# Patient Record
Sex: FEMALE | Race: WHITE | NOT HISPANIC OR LATINO
[De-identification: names, ages, dates, MRNs, and addresses within clinical notes are randomized per-mention and may not be internally consistent; named-entity substitution may affect disease eponyms.]

---

## 2020-07-16 ENCOUNTER — APPOINTMENT (OUTPATIENT)
Dept: GERIATRICS | Facility: CLINIC | Age: 85
End: 2020-07-16

## 2020-07-16 ENCOUNTER — RESULT REVIEW (OUTPATIENT)
Age: 85
End: 2020-07-16

## 2020-07-16 VITALS
RESPIRATION RATE: 18 BRPM | HEART RATE: 70 BPM | DIASTOLIC BLOOD PRESSURE: 80 MMHG | HEIGHT: 66 IN | SYSTOLIC BLOOD PRESSURE: 164 MMHG | BODY MASS INDEX: 23.3 KG/M2 | WEIGHT: 145 LBS

## 2020-07-16 DIAGNOSIS — I49.3 VENTRICULAR PREMATURE DEPOLARIZATION: ICD-10-CM

## 2020-07-16 DIAGNOSIS — Z87.81 PERSONAL HISTORY OF (HEALED) TRAUMATIC FRACTURE: ICD-10-CM

## 2020-07-16 NOTE — ADDENDUM
[FreeTextEntry1] : Pt with fractures of 4 and 5 metatarsal. spoke to pt at radiology and she refused ER visit. Therefore i asked her to returned to office and i secured lightly with yunier taping/gauze in between toes. Lightly wrapped foot with ACE wrap. Instructed pt to rest and elevate; avoid walking if possible, remove ace wrap and tape if it becomes tight or uncomfortable. She verbalized understanding

## 2020-07-16 NOTE — HISTORY OF PRESENT ILLNESS
[FreeTextEntry1] : Pt states she caught her foot underneath the leg of a heavy chair on 7/15 in the evening\par and fell onto her leg Did not hit her head or other part of her body\par Now has pain and swelling but able to walk.\par She did go to [place] today for dental work via a taxi\par Has been icing her foot.\par No pain in ankle or leg

## 2020-07-16 NOTE — PHYSICAL EXAM
[General Appearance - Alert] : alert [General Appearance - In No Acute Distress] : in no acute distress [Sclera] : the sclera and conjunctiva were normal [PERRL With Normal Accommodation] : pupils were equal in size, round, and reactive to light [Respiration, Rhythm And Depth] : normal respiratory rhythm and effort [] : no respiratory distress [Auscultation Breath Sounds / Voice Sounds] : lungs were clear to auscultation bilaterally [Heart Rate And Rhythm] : heart rate was normal and rhythm regular [Apical Impulse] : the apical impulse was normal [Heart Sounds] : normal S1 and S2 [Motor Tone] : muscle strength and tone were normal [Abnormal Walk] : normal gait [FreeTextEntry1] : dorsum of left foot with swelling pain and bruising. No pain on palption of left ankle, no crepitus, no deformities. Able to move toes and ambulate wnl

## 2021-03-30 ENCOUNTER — APPOINTMENT (OUTPATIENT)
Dept: OBGYN | Facility: CLINIC | Age: 86
End: 2021-03-30
Payer: MEDICARE

## 2021-03-30 VITALS
WEIGHT: 143 LBS | SYSTOLIC BLOOD PRESSURE: 142 MMHG | HEIGHT: 64 IN | DIASTOLIC BLOOD PRESSURE: 84 MMHG | BODY MASS INDEX: 24.41 KG/M2

## 2021-03-30 DIAGNOSIS — Z01.419 ENCOUNTER FOR GYNECOLOGICAL EXAMINATION (GENERAL) (ROUTINE) W/OUT ABNORMAL FINDINGS: ICD-10-CM

## 2021-03-30 PROCEDURE — G0101: CPT

## 2021-03-31 PROBLEM — Z01.419 ENCOUNTER FOR ANNUAL ROUTINE GYNECOLOGICAL EXAMINATION: Status: RESOLVED | Noted: 2021-03-30 | Resolved: 2021-04-13

## 2021-03-31 NOTE — HISTORY OF PRESENT ILLNESS
[FreeTextEntry1] : 86 y o female presents to initiate  GYN care\par Last pap smear reportedly benign approximately four years ago\par Denies current GYN complaints\par Reports normal bowel and bladder function\par Not currently sexually active\par Postmenopausal with atrophic vaginitis for which she uses Premarin cream twice weekly\par Osteoporosis on Fosamax\par Colonoscopy > 10 yrs ago: normal ( did hemoccult yearly in past)\par Advised will prescribe Premarin cream when breast imaging results received and reviewed\par Breast imaging 2021 reportedly benign\par Bone density 2020 Osteoporosis

## 2021-04-14 ENCOUNTER — APPOINTMENT (OUTPATIENT)
Dept: INTERNAL MEDICINE | Facility: CLINIC | Age: 86
End: 2021-04-14
Payer: MEDICARE

## 2021-04-14 VITALS
TEMPERATURE: 97.5 F | HEART RATE: 83 BPM | BODY MASS INDEX: 21.17 KG/M2 | HEIGHT: 64 IN | OXYGEN SATURATION: 98 % | DIASTOLIC BLOOD PRESSURE: 82 MMHG | WEIGHT: 124 LBS | SYSTOLIC BLOOD PRESSURE: 148 MMHG

## 2021-04-14 DIAGNOSIS — I49.3 VENTRICULAR PREMATURE DEPOLARIZATION: ICD-10-CM

## 2021-04-14 DIAGNOSIS — M79.89 OTHER SPECIFIED SOFT TISSUE DISORDERS: ICD-10-CM
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
## 2021-04-14 DIAGNOSIS — M79.672 PAIN IN LEFT FOOT: ICD-10-CM

## 2021-04-14 DIAGNOSIS — S90.32XA CONTUSION OF LEFT FOOT, INITIAL ENCOUNTER: ICD-10-CM

## 2021-04-14 DIAGNOSIS — S92.343A DISPLACED FRACTURE OF FOURTH METATARSAL BONE, UNSPECIFIED FOOT, INITIAL ENCOUNTER FOR CLOSED FRACTURE: ICD-10-CM

## 2021-04-14 DIAGNOSIS — S92.353A DISPLACED FRACTURE OF FIFTH METATARSAL BONE, UNSPECIFIED FOOT, INITIAL ENCOUNTER FOR CLOSED FRACTURE: ICD-10-CM

## 2021-04-14 PROCEDURE — 99204 OFFICE O/P NEW MOD 45 MIN: CPT

## 2021-04-14 PROCEDURE — 36415 COLL VENOUS BLD VENIPUNCTURE: CPT

## 2021-04-14 RX ORDER — CYCLOSPORINE 0.5 MG/ML
0.05 EMULSION OPHTHALMIC TWICE DAILY
Refills: 0 | Status: ACTIVE | COMMUNITY
Start: 2021-04-14

## 2021-04-14 NOTE — REVIEW OF SYSTEMS
[Recent Change In Weight] : ~T recent weight change [Negative] : Neurological [FreeTextEntry2] : weight loss?

## 2021-04-14 NOTE — HISTORY OF PRESENT ILLNESS
[FreeTextEntry8] : here  to establish care , former px of dr Freitas, doing we ll \par  lives at [facility]

## 2021-05-03 ENCOUNTER — TRANSCRIPTION ENCOUNTER (OUTPATIENT)
Age: 86
End: 2021-05-03

## 2021-05-04 ENCOUNTER — TRANSCRIPTION ENCOUNTER (OUTPATIENT)
Age: 86
End: 2021-05-04

## 2021-05-14 ENCOUNTER — APPOINTMENT (OUTPATIENT)
Dept: INTERNAL MEDICINE | Facility: CLINIC | Age: 86
End: 2021-05-14

## 2021-05-19 ENCOUNTER — APPOINTMENT (OUTPATIENT)
Dept: INTERNAL MEDICINE | Facility: CLINIC | Age: 86
End: 2021-05-19
Payer: MEDICARE

## 2021-05-19 ENCOUNTER — LABORATORY RESULT (OUTPATIENT)
Age: 86
End: 2021-05-19

## 2021-05-19 VITALS
BODY MASS INDEX: 21.51 KG/M2 | TEMPERATURE: 96.7 F | DIASTOLIC BLOOD PRESSURE: 92 MMHG | SYSTOLIC BLOOD PRESSURE: 138 MMHG | HEART RATE: 76 BPM | HEIGHT: 64 IN | WEIGHT: 126 LBS

## 2021-05-19 DIAGNOSIS — Z78.9 OTHER SPECIFIED HEALTH STATUS: ICD-10-CM

## 2021-05-19 DIAGNOSIS — Z76.89 PERSONS ENCOUNTERING HEALTH SERVICES IN OTHER SPECIFIED CIRCUMSTANCES: ICD-10-CM

## 2021-05-19 PROCEDURE — 99214 OFFICE O/P EST MOD 30 MIN: CPT | Mod: 25

## 2021-05-19 PROCEDURE — 36415 COLL VENOUS BLD VENIPUNCTURE: CPT

## 2021-05-19 NOTE — REVIEW OF SYSTEMS
[Fever] : no fever [Chills] : no chills [Recent Change In Weight] : ~T no recent weight change [Joint Pain] : no joint pain [Muscle Weakness] : muscle weakness [Muscle Pain] : no muscle pain [Negative] : Neurological [FreeTextEntry9] : LE subjective

## 2021-05-19 NOTE — HISTORY OF PRESENT ILLNESS
[de-identified] : here for medical f/u , doing well , had some blood work with endo , high iPTH , (all results reviewed with patient)\par restarted training , feels less strong in  lower extremities

## 2021-05-20 LAB
APPEARANCE: ABNORMAL
BILIRUBIN URINE: NEGATIVE
BLOOD URINE: NEGATIVE
CHOLEST SERPL-MCNC: 239 MG/DL
COLOR: YELLOW
FOLATE SERPL-MCNC: 8.1 NG/ML
GLUCOSE QUALITATIVE U: NEGATIVE
HDLC SERPL-MCNC: 111 MG/DL
KETONES URINE: NEGATIVE
LDLC SERPL CALC-MCNC: 113 MG/DL
LEUKOCYTE ESTERASE URINE: ABNORMAL
NITRITE URINE: NEGATIVE
NONHDLC SERPL-MCNC: 128 MG/DL
PH URINE: 6
PROTEIN URINE: ABNORMAL
SPECIFIC GRAVITY URINE: 1.01
TRIGL SERPL-MCNC: 77 MG/DL
UROBILINOGEN URINE: NORMAL
VIT B12 SERPL-MCNC: 696 PG/ML

## 2021-05-25 ENCOUNTER — APPOINTMENT (OUTPATIENT)
Dept: INTERNAL MEDICINE | Facility: CLINIC | Age: 86
End: 2021-05-25
Payer: MEDICARE

## 2021-05-25 PROCEDURE — P9615: CPT

## 2021-05-26 LAB
CREAT SPEC-SCNC: 78 MG/DL
CREAT/PROT UR: 0.8 RATIO
PROT UR-MCNC: 64 MG/DL

## 2021-05-27 ENCOUNTER — TRANSCRIPTION ENCOUNTER (OUTPATIENT)
Age: 86
End: 2021-05-27

## 2021-06-30 ENCOUNTER — APPOINTMENT (OUTPATIENT)
Dept: INTERNAL MEDICINE | Facility: CLINIC | Age: 86
End: 2021-06-30
Payer: MEDICARE

## 2021-06-30 VITALS
TEMPERATURE: 97.1 F | BODY MASS INDEX: 21.17 KG/M2 | OXYGEN SATURATION: 98 % | HEART RATE: 83 BPM | HEIGHT: 64 IN | DIASTOLIC BLOOD PRESSURE: 76 MMHG | SYSTOLIC BLOOD PRESSURE: 144 MMHG | WEIGHT: 124 LBS

## 2021-06-30 PROCEDURE — 99214 OFFICE O/P EST MOD 30 MIN: CPT

## 2021-06-30 NOTE — HISTORY OF PRESENT ILLNESS
[de-identified] : here for medical f/u , wants to find out why losing weight, had visit c dr Hernandez, labs and US reviewed c px\par no physical complaints \par no real GI complaints  but colonoscopy in a many years

## 2021-06-30 NOTE — REVIEW OF SYSTEMS
[Fever] : no fever [Chills] : no chills [Recent Change In Weight] : ~T recent weight change [Abdominal Pain] : no abdominal pain [Constipation] : no constipation [Diarrhea] : diarrhea [Vomiting] : no vomiting [Melena] : no melena [Joint Pain] : no joint pain [Muscle Weakness] : muscle weakness [Muscle Pain] : no muscle pain [Negative] : Neurological [FreeTextEntry2] : 10 lbs over 1 year [FreeTextEntry9] : LE subjective

## 2021-07-06 ENCOUNTER — TRANSCRIPTION ENCOUNTER (OUTPATIENT)
Age: 86
End: 2021-07-06

## 2021-07-23 ENCOUNTER — TRANSCRIPTION ENCOUNTER (OUTPATIENT)
Age: 86
End: 2021-07-23

## 2021-08-03 ENCOUNTER — APPOINTMENT (OUTPATIENT)
Dept: INTERNAL MEDICINE | Facility: CLINIC | Age: 86
End: 2021-08-03
Payer: MEDICARE

## 2021-08-03 VITALS
HEIGHT: 64 IN | TEMPERATURE: 96.9 F | DIASTOLIC BLOOD PRESSURE: 80 MMHG | HEART RATE: 71 BPM | BODY MASS INDEX: 22.02 KG/M2 | SYSTOLIC BLOOD PRESSURE: 132 MMHG | OXYGEN SATURATION: 99 % | WEIGHT: 129 LBS

## 2021-08-03 PROCEDURE — 99213 OFFICE O/P EST LOW 20 MIN: CPT

## 2021-08-03 NOTE — HEALTH RISK ASSESSMENT
[No] : No [0] : 2) Feeling down, depressed, or hopeless: Not at all (0) [PHQ-2 Negative - No further assessment needed] : PHQ-2 Negative - No further assessment needed [[id number]] : 0

## 2021-08-03 NOTE — HISTORY OF PRESENT ILLNESS
[de-identified] : here for BP check after split in 1/2 the CCB dose, readings are great 120/70 mmHg average\par persistent LE paresthesias\par very active

## 2021-08-03 NOTE — REVIEW OF SYSTEMS
[Fever] : no fever [Chills] : no chills [Recent Change In Weight] : ~T no recent weight change [Abdominal Pain] : no abdominal pain [Constipation] : no constipation [Diarrhea] : diarrhea [Vomiting] : no vomiting [Melena] : no melena [Joint Pain] : no joint pain [Muscle Weakness] : no muscle weakness [Muscle Pain] : no muscle pain [Headache] : no headache [Dizziness] : no dizziness [Unsteady Walking] : no ataxia [Negative] : Respiratory [FreeTextEntry2] : gained 2 lb [de-identified] : LE numbness

## 2021-08-25 LAB — CYTOLOGY CVX/VAG DOC THIN PREP: NORMAL

## 2021-09-03 ENCOUNTER — APPOINTMENT (OUTPATIENT)
Dept: GASTROENTEROLOGY | Facility: CLINIC | Age: 86
End: 2021-09-03
Payer: MEDICARE

## 2021-09-03 VITALS
WEIGHT: 130 LBS | SYSTOLIC BLOOD PRESSURE: 168 MMHG | DIASTOLIC BLOOD PRESSURE: 84 MMHG | HEIGHT: 64 IN | BODY MASS INDEX: 22.2 KG/M2

## 2021-09-03 PROCEDURE — 99204 OFFICE O/P NEW MOD 45 MIN: CPT

## 2021-09-03 NOTE — ASSESSMENT
[FreeTextEntry1] : Will initially begin workup of unexplained weight loss with a CT A/P.\par \par If unremarkable, would plan on an EGD and colonoscopy for further evaluation.\par \par Thank you for referring Ms. HAMPTON.  Please do not hesitate to call to further discuss his/her care.\par \par Note faxed to requesting MD.\par \par

## 2021-09-03 NOTE — HISTORY OF PRESENT ILLNESS
[FreeTextEntry1] : Ms. Mg is a pleasant 86F h/o mixed connective tissue disorder, hyperparathyroid, HTN, osteoporosis, rotator cuff tear, nephrolithiasis, pre-DM who is referred by Dr. Ramirez for unexplained weight loss.\par \par States over the past year or two she has lost around 6 lbs without trying.  Has had a good appetite, has been eating a large amount. Her weight loss has mainly stabilized.\par \par She stopped working out during the COVID pandemic, although she has started again, thinks she may have lost some muscle mass.\par \elizabeth Generally feels well, has had mild constipation which improved with dietary changes.\par \par No rectal bleeding, black stool, abd pain, N/V, heartburn.\par \par She has had two colonoscopies previously, most recently 2007, both were "normal" as per her report.\par \elizabeth Has never had an EGD.\par \par Does not smoke or drink.\par \par Labs reviewed from 10/22/20 (scanned into chart).

## 2021-09-20 ENCOUNTER — TRANSCRIPTION ENCOUNTER (OUTPATIENT)
Age: 86
End: 2021-09-20

## 2021-09-22 ENCOUNTER — TRANSCRIPTION ENCOUNTER (OUTPATIENT)
Age: 86
End: 2021-09-22

## 2021-09-24 ENCOUNTER — RESULT REVIEW (OUTPATIENT)
Age: 86
End: 2021-09-24

## 2021-09-27 ENCOUNTER — APPOINTMENT (OUTPATIENT)
Dept: INTERNAL MEDICINE | Facility: CLINIC | Age: 86
End: 2021-09-27
Payer: MEDICARE

## 2021-09-27 VITALS
BODY MASS INDEX: 21.17 KG/M2 | HEART RATE: 72 BPM | SYSTOLIC BLOOD PRESSURE: 138 MMHG | WEIGHT: 124 LBS | TEMPERATURE: 96 F | HEIGHT: 64 IN | OXYGEN SATURATION: 98 % | DIASTOLIC BLOOD PRESSURE: 80 MMHG

## 2021-09-27 DIAGNOSIS — R93.5 ABNORMAL FINDINGS ON DIAGNOSTIC IMAGING OF OTHER ABDOMINAL REGIONS, INCLUDING RETROPERITONEUM: ICD-10-CM

## 2021-09-27 DIAGNOSIS — Z00.00 ENCOUNTER FOR GENERAL ADULT MEDICAL EXAMINATION W/OUT ABNORMAL FINDINGS: ICD-10-CM

## 2021-09-27 PROCEDURE — 99214 OFFICE O/P EST MOD 30 MIN: CPT | Mod: 25

## 2021-09-27 PROCEDURE — 36415 COLL VENOUS BLD VENIPUNCTURE: CPT

## 2021-09-27 NOTE — PHYSICAL EXAM
[No Edema] : there was no peripheral edema [Normal] : normal gait, coordination grossly intact, no focal deficits and deep tendon reflexes were 2+ and symmetric [de-identified] : L forearm cellulitis resolved

## 2021-09-27 NOTE — HISTORY OF PRESENT ILLNESS
[de-identified] : here for medical f/u  after L arm cellulitis , , skin flap healed , \par also abnormal creat on sat prior to CT c IV contrast ordered by carmela Hong\par her creat was 1.7( !! new)\par she feels relatively well

## 2021-09-27 NOTE — HEALTH RISK ASSESSMENT
[] : No [No] : No [0] : 2) Feeling down, depressed, or hopeless: Not at all (0) [PHQ-2 Negative - No further assessment needed] : PHQ-2 Negative - No further assessment needed [[id number]] : 0

## 2021-09-28 ENCOUNTER — TRANSCRIPTION ENCOUNTER (OUTPATIENT)
Age: 86
End: 2021-09-28

## 2021-09-28 LAB
ALBUMIN SERPL ELPH-MCNC: 5 G/DL
ALP BLD-CCNC: 76 U/L
ALT SERPL-CCNC: 26 U/L
ANION GAP SERPL CALC-SCNC: 18 MMOL/L
AST SERPL-CCNC: 34 U/L
BASOPHILS # BLD AUTO: 0.06 K/UL
BASOPHILS NFR BLD AUTO: 1.1 %
BILIRUB SERPL-MCNC: 0.2 MG/DL
BUN SERPL-MCNC: 45 MG/DL
CALCIUM SERPL-MCNC: 10.7 MG/DL
CHLORIDE SERPL-SCNC: 101 MMOL/L
CO2 SERPL-SCNC: 17 MMOL/L
CREAT SERPL-MCNC: 1.75 MG/DL
EOSINOPHIL # BLD AUTO: 0.03 K/UL
EOSINOPHIL NFR BLD AUTO: 0.5 %
GLUCOSE SERPL-MCNC: 117 MG/DL
HCT VFR BLD CALC: 40.7 %
HGB BLD-MCNC: 12.9 G/DL
IMM GRANULOCYTES NFR BLD AUTO: 0.2 %
LYMPHOCYTES # BLD AUTO: 1.38 K/UL
LYMPHOCYTES NFR BLD AUTO: 24.2 %
MAN DIFF?: NORMAL
MCHC RBC-ENTMCNC: 31.7 GM/DL
MCHC RBC-ENTMCNC: 32 PG
MCV RBC AUTO: 101 FL
MONOCYTES # BLD AUTO: 0.53 K/UL
MONOCYTES NFR BLD AUTO: 9.3 %
NEUTROPHILS # BLD AUTO: 3.69 K/UL
NEUTROPHILS NFR BLD AUTO: 64.7 %
PLATELET # BLD AUTO: 125 K/UL
POTASSIUM SERPL-SCNC: 5.3 MMOL/L
PROT SERPL-MCNC: 7.9 G/DL
RBC # BLD: 4.03 M/UL
RBC # FLD: 13.2 %
SODIUM SERPL-SCNC: 136 MMOL/L
WBC # FLD AUTO: 5.7 K/UL

## 2021-09-29 ENCOUNTER — TRANSCRIPTION ENCOUNTER (OUTPATIENT)
Age: 86
End: 2021-09-29

## 2021-10-05 ENCOUNTER — APPOINTMENT (OUTPATIENT)
Dept: INTERNAL MEDICINE | Facility: CLINIC | Age: 86
End: 2021-10-05
Payer: MEDICARE

## 2021-10-05 PROCEDURE — 36415 COLL VENOUS BLD VENIPUNCTURE: CPT

## 2021-10-06 ENCOUNTER — TRANSCRIPTION ENCOUNTER (OUTPATIENT)
Age: 86
End: 2021-10-06

## 2021-10-06 LAB
ANION GAP SERPL CALC-SCNC: 12 MMOL/L
BUN SERPL-MCNC: 36 MG/DL
CALCIUM SERPL-MCNC: 10.3 MG/DL
CHLORIDE SERPL-SCNC: 104 MMOL/L
CO2 SERPL-SCNC: 22 MMOL/L
CREAT SERPL-MCNC: 1.44 MG/DL
GLUCOSE SERPL-MCNC: 114 MG/DL
POTASSIUM SERPL-SCNC: 5.3 MMOL/L
SODIUM SERPL-SCNC: 137 MMOL/L

## 2021-10-07 ENCOUNTER — APPOINTMENT (OUTPATIENT)
Dept: UROLOGY | Facility: CLINIC | Age: 86
End: 2021-10-07
Payer: MEDICARE

## 2021-10-07 ENCOUNTER — TRANSCRIPTION ENCOUNTER (OUTPATIENT)
Age: 86
End: 2021-10-07

## 2021-10-07 VITALS
HEIGHT: 64 IN | SYSTOLIC BLOOD PRESSURE: 192 MMHG | WEIGHT: 124 LBS | BODY MASS INDEX: 21.17 KG/M2 | DIASTOLIC BLOOD PRESSURE: 84 MMHG | HEART RATE: 102 BPM | TEMPERATURE: 98.1 F

## 2021-10-07 PROCEDURE — 99204 OFFICE O/P NEW MOD 45 MIN: CPT

## 2021-10-07 PROCEDURE — 51798 US URINE CAPACITY MEASURE: CPT

## 2021-10-07 PROCEDURE — 36415 COLL VENOUS BLD VENIPUNCTURE: CPT

## 2021-10-07 NOTE — PHYSICAL EXAM

## 2021-10-07 NOTE — ADDENDUM
[FreeTextEntry1] : PVR\par A transabdominal ultrasound shows incomplete emptying of bladder small volume but there is significant thickening of the bladder wall suggesting work hypertrophy

## 2021-10-07 NOTE — ASSESSMENT
[FreeTextEntry1] : This is an initial visit for urology for this 86-year-old patient who is enjoyed good health all her life\par She has a history of hyperparathyroidism under control and no history of kidney stones\par She has hypertension under good control\par He is here because it has been a sudden dramatic change in her EGFR\par She has had no flank pain or renal incident to account for this\par On review of the CAT scan which was done without contrast there is clearly a right UPJ stenosis multiple renal cysts\par But fortunately there is no loss of renal parenchyma\par Therefore the change in kidney function is probably not related to his chronic congenital UPJ stenosis\par There's been a dramatic change in her kidney function which is unaccounted for\par The only event that has occurred his infection the cellulitis in her left forearm for which she was given Bactrim\par The kidney function and a very gratifying fashion is now improving\par I repeated today's kidney function test suggested that it be done weekly until the stable value is  achieved\par I discussed with her the condition of the UPJ stenosis which requires close followup perhaps every 6 months until we see that the stable her anteriorly after that\par I suspect with preservation of good kidney function at 86 years and this is not going to be a clinical problem\par She is aware that if there is a change a robotic pyeloplasty can be achieved however I don't think that'll be necessary\par An unrelated issue I believe this onset of urinary frequency which has been present now for several months\par This tissue is confirmed by an ultrasound which shows bladder wall thickening\par This is a minor problem which is easily corrected\par I suggested that she put off addressing this issue until the kidney function is restored to its prior level

## 2021-10-08 LAB
APPEARANCE: CLEAR
BACTERIA: NEGATIVE
BILIRUBIN URINE: NEGATIVE
BLOOD URINE: NEGATIVE
COLOR: NORMAL
GLUCOSE QUALITATIVE U: NEGATIVE
HYALINE CASTS: 1 /LPF
KETONES URINE: NEGATIVE
LEUKOCYTE ESTERASE URINE: NEGATIVE
MICROSCOPIC-UA: NORMAL
NITRITE URINE: NEGATIVE
PH URINE: 6
PROTEIN URINE: ABNORMAL
RED BLOOD CELLS URINE: 0 /HPF
SPECIFIC GRAVITY URINE: 1.01
SQUAMOUS EPITHELIAL CELLS: 1 /HPF
UROBILINOGEN URINE: NORMAL
WHITE BLOOD CELLS URINE: 1 /HPF

## 2021-10-13 ENCOUNTER — APPOINTMENT (OUTPATIENT)
Dept: UROLOGY | Facility: CLINIC | Age: 86
End: 2021-10-13
Payer: MEDICARE

## 2021-10-13 VITALS
TEMPERATURE: 98.1 F | WEIGHT: 124 LBS | HEART RATE: 80 BPM | SYSTOLIC BLOOD PRESSURE: 174 MMHG | HEIGHT: 64 IN | DIASTOLIC BLOOD PRESSURE: 84 MMHG | BODY MASS INDEX: 21.17 KG/M2

## 2021-10-13 PROCEDURE — 51798 US URINE CAPACITY MEASURE: CPT

## 2021-10-13 PROCEDURE — 36415 COLL VENOUS BLD VENIPUNCTURE: CPT

## 2021-10-13 PROCEDURE — 99214 OFFICE O/P EST MOD 30 MIN: CPT

## 2021-10-13 NOTE — ASSESSMENT
[FreeTextEntry1] : This is a followup for this 86-year-old patient for injury to her left forearm were then developed cellulitis and was given Bactrim\par There is a significant drop in her renal function baseline but after discontinuing the Bactrim kidney function is returning\par Today repeated the creatinine clearance and when she is stabilized we'll further in evaluating her voiding dysfunction\par Incidentally noted was a UPJ stenosis which may have some part of her renal insufficiency\par His good maintenance of parenchymal thickness however\par I believe this is to be followed in I. will suggest a baseline renal scan for future comparison when her creatinine clearance stabilizes\par she brought up the issue is whether her bladder is thickwalled with same\par Advice on CAT scan with a full bladder the wall thickness appears thin but on ultrasound the bladder wall is somewhat thickened compared to normal

## 2021-10-14 ENCOUNTER — NON-APPOINTMENT (OUTPATIENT)
Age: 86
End: 2021-10-14

## 2021-10-15 ENCOUNTER — NON-APPOINTMENT (OUTPATIENT)
Age: 86
End: 2021-10-15

## 2021-10-16 ENCOUNTER — NON-APPOINTMENT (OUTPATIENT)
Age: 86
End: 2021-10-16

## 2021-10-18 ENCOUNTER — TRANSCRIPTION ENCOUNTER (OUTPATIENT)
Age: 86
End: 2021-10-18

## 2021-10-18 ENCOUNTER — NON-APPOINTMENT (OUTPATIENT)
Age: 86
End: 2021-10-18

## 2021-10-20 ENCOUNTER — TRANSCRIPTION ENCOUNTER (OUTPATIENT)
Age: 86
End: 2021-10-20

## 2021-10-20 ENCOUNTER — APPOINTMENT (OUTPATIENT)
Dept: UROLOGY | Facility: CLINIC | Age: 86
End: 2021-10-20

## 2021-10-29 ENCOUNTER — LABORATORY RESULT (OUTPATIENT)
Age: 86
End: 2021-10-29

## 2021-10-29 ENCOUNTER — APPOINTMENT (OUTPATIENT)
Dept: INTERNAL MEDICINE | Facility: CLINIC | Age: 86
End: 2021-10-29
Payer: MEDICARE

## 2021-10-29 ENCOUNTER — APPOINTMENT (OUTPATIENT)
Dept: UROLOGY | Facility: CLINIC | Age: 86
End: 2021-10-29

## 2021-10-29 VITALS
DIASTOLIC BLOOD PRESSURE: 84 MMHG | HEIGHT: 65 IN | BODY MASS INDEX: 20.33 KG/M2 | TEMPERATURE: 97.8 F | HEART RATE: 80 BPM | WEIGHT: 122 LBS | SYSTOLIC BLOOD PRESSURE: 150 MMHG

## 2021-10-29 VITALS — DIASTOLIC BLOOD PRESSURE: 80 MMHG | SYSTOLIC BLOOD PRESSURE: 150 MMHG

## 2021-10-29 DIAGNOSIS — L03.114 CELLULITIS OF LEFT UPPER LIMB: ICD-10-CM

## 2021-10-29 PROCEDURE — G0439: CPT

## 2021-10-29 PROCEDURE — 99214 OFFICE O/P EST MOD 30 MIN: CPT | Mod: 25

## 2021-10-29 RX ORDER — SULFAMETHOXAZOLE AND TRIMETHOPRIM 800; 160 MG/1; MG/1
800-160 TABLET ORAL
Qty: 20 | Refills: 0 | Status: DISCONTINUED | COMMUNITY
Start: 2021-09-17 | End: 2021-10-29

## 2021-10-29 NOTE — HISTORY OF PRESENT ILLNESS
[de-identified] : here for wellness exam, doing well , very active vidhi Escobar urologist \par  10 /17 , accidental fall in her apt - seen in ED - all imaging neg , \par  has upcoming appointment  c dr Shine and dr Cole

## 2021-10-29 NOTE — PHYSICAL EXAM
[Normal] : no joint swelling, grossly normal strength/tone [de-identified] : bilat normlal no lumps [de-identified] : thoracic scoliosis (old) [de-identified] : fro age

## 2021-10-29 NOTE — HEALTH RISK ASSESSMENT
[Good] : ~his/her~  mood as  good [Yes] : Yes [0] : 2) Feeling down, depressed, or hopeless: Not at all (0) [PHQ-2 Negative - No further assessment needed] : PHQ-2 Negative - No further assessment needed [Patient reported mammogram was normal] : Patient reported mammogram was normal [Patient reported bone density results were abnormal] : Patient reported bone density results were abnormal [Alone] : lives alone [College] : College [] :  [# Of Children ___] : has [unfilled] children [] : No [[id number]] : 0 [MammogramDate] : 11/20 [BoneDensityDate] : 01/20 [de-identified] : [place]

## 2021-10-29 NOTE — REVIEW OF SYSTEMS
[Negative] : Heme/Lymph [Muscle Pain] : muscle pain [Muscle Weakness] : no muscle weakness [FreeTextEntry9] : lower back and buttocks after fall, better every day

## 2021-11-01 ENCOUNTER — TRANSCRIPTION ENCOUNTER (OUTPATIENT)
Age: 86
End: 2021-11-01

## 2021-11-01 LAB
ALBUMIN SERPL ELPH-MCNC: 4.7 G/DL
ALP BLD-CCNC: 110 U/L
ALT SERPL-CCNC: 13 U/L
ANION GAP SERPL CALC-SCNC: 13 MMOL/L
APPEARANCE: CLEAR
AST SERPL-CCNC: 17 U/L
BASOPHILS # BLD AUTO: 0.04 K/UL
BASOPHILS NFR BLD AUTO: 0.6 %
BILIRUB SERPL-MCNC: 0.5 MG/DL
BILIRUBIN URINE: NEGATIVE
BLOOD URINE: NEGATIVE
BUN SERPL-MCNC: 34 MG/DL
CALCIUM SERPL-MCNC: 10.8 MG/DL
CHLORIDE SERPL-SCNC: 104 MMOL/L
CHOLEST SERPL-MCNC: 231 MG/DL
CO2 SERPL-SCNC: 22 MMOL/L
COLOR: NORMAL
CREAT SERPL-MCNC: 1.36 MG/DL
CREAT SPEC-SCNC: 72 MG/DL
CREAT/PROT UR: 1.1 RATIO
EOSINOPHIL # BLD AUTO: 0.06 K/UL
EOSINOPHIL NFR BLD AUTO: 0.8 %
FOLATE SERPL-MCNC: 9.1 NG/ML
GLUCOSE QUALITATIVE U: NEGATIVE
GLUCOSE SERPL-MCNC: 124 MG/DL
HCT VFR BLD CALC: 38.3 %
HDLC SERPL-MCNC: 103 MG/DL
HGB BLD-MCNC: 11.9 G/DL
IMM GRANULOCYTES NFR BLD AUTO: 0.1 %
KETONES URINE: NEGATIVE
LDLC SERPL CALC-MCNC: 114 MG/DL
LEUKOCYTE ESTERASE URINE: NEGATIVE
LYMPHOCYTES # BLD AUTO: 1.21 K/UL
LYMPHOCYTES NFR BLD AUTO: 17 %
MAN DIFF?: NORMAL
MCHC RBC-ENTMCNC: 31.1 GM/DL
MCHC RBC-ENTMCNC: 31.7 PG
MCV RBC AUTO: 102.1 FL
MONOCYTES # BLD AUTO: 0.72 K/UL
MONOCYTES NFR BLD AUTO: 10.1 %
NEUTROPHILS # BLD AUTO: 5.06 K/UL
NEUTROPHILS NFR BLD AUTO: 71.4 %
NITRITE URINE: NEGATIVE
NONHDLC SERPL-MCNC: 129 MG/DL
PH URINE: 6
PLATELET # BLD AUTO: 168 K/UL
POTASSIUM SERPL-SCNC: 5.2 MMOL/L
PROT SERPL-MCNC: 7.3 G/DL
PROT UR-MCNC: 79 MG/DL
PROTEIN URINE: ABNORMAL
RBC # BLD: 3.75 M/UL
RBC # FLD: 12.9 %
SODIUM SERPL-SCNC: 139 MMOL/L
SPECIFIC GRAVITY URINE: 1.01
T4 FREE SERPL-MCNC: 1.5 NG/DL
TRIGL SERPL-MCNC: 73 MG/DL
TSH SERPL-ACNC: 1.6 UIU/ML
UROBILINOGEN URINE: NORMAL
VIT B12 SERPL-MCNC: 528 PG/ML
WBC # FLD AUTO: 7.1 K/UL

## 2021-11-03 ENCOUNTER — TRANSCRIPTION ENCOUNTER (OUTPATIENT)
Age: 86
End: 2021-11-03

## 2021-11-04 ENCOUNTER — APPOINTMENT (OUTPATIENT)
Dept: NEUROLOGY | Facility: CLINIC | Age: 86
End: 2021-11-04
Payer: MEDICARE

## 2021-11-04 ENCOUNTER — NON-APPOINTMENT (OUTPATIENT)
Age: 86
End: 2021-11-04

## 2021-11-04 ENCOUNTER — LABORATORY RESULT (OUTPATIENT)
Age: 86
End: 2021-11-04

## 2021-11-04 VITALS
BODY MASS INDEX: 20.33 KG/M2 | WEIGHT: 122 LBS | TEMPERATURE: 98.2 F | HEIGHT: 65 IN | HEART RATE: 99 BPM | SYSTOLIC BLOOD PRESSURE: 175 MMHG | DIASTOLIC BLOOD PRESSURE: 94 MMHG

## 2021-11-04 PROCEDURE — 99203 OFFICE O/P NEW LOW 30 MIN: CPT

## 2021-11-08 ENCOUNTER — TRANSCRIPTION ENCOUNTER (OUTPATIENT)
Age: 86
End: 2021-11-08

## 2021-11-08 NOTE — CONSULT LETTER
[Dear  ___] : Dear  [unfilled], [FreeTextEntry1] : I had the pleasure of evaluating your patient, KARTHIK HAMPTON. Please see the assessment section below for a summary of my diagnostic impression and plan.\par \par Thank you very much for allowing me to participate in the care of this patient. If you have any questions, please do not hesitate to contact me. \par \par Sincerely,\par \par Rosanna Hester MD\par

## 2021-11-08 NOTE — DATA REVIEWED
[de-identified] : October 17, 2021: CT cervical spine and CT head reviewed, see scanned documents.

## 2021-11-08 NOTE — REVIEW OF SYSTEMS
[Feeling Tired] : feeling tired [Numbness] : numbness [Anxiety] : anxiety [Loss Of Hearing] : hearing loss [Joint Pain] : joint pain [Itching] : itching [Swollen Glands] : swollen glands [Negative] : Gastrointestinal [de-identified] : dry skin [de-identified] : thyroid nodule

## 2021-11-08 NOTE — PHYSICAL EXAM
[FreeTextEntry1] : Physical examination \par General: No acute distress, Awake, Alert.   \par \par Mental status \par Awake, alert, and oriented to person, time and place, Normal attention span and concentration, Recent and remote memory intact, Language intact, Fund of knowledge intact.   \par \par \par Cranial Nerves \par II: VFF  \par III, IV, VI: PERRL, EOMI.   \par V: Facial sensation is normal B/L.   \par VII: Facial strength is normal B/L. \par VIII: Decreased hearing, bilaterally. \par IX, X: Palate is midline and elevates symmetrically.   \par XI: Trapezius normal strength.   \par XII: Tongue midline without atrophy or fasciculations. \par \par Motor exam  \par Muscle tone - mild cogwheel rigidity in arms. \par No atrophy or fasciculations \par Muscle Strength: arms and legs, proximal and distal flexors and extensors are normal \par \par No UE drift.\par \par Reflexes \par \par Diffuse hyperreflexia.  \par \par Plantars right: mute.   \par Plantars left: mute.   \par \par Coordination \par Finger to nose: Normal.  \par Heel to shin: Normal.   \par \par \par Sensory \par Intact sensation to vibration, PP and cold.\par \par \par \par Gait \par Normal including heels, toes, and tandem gait - all normal for age.\par \par Slow, wide based gait. \par \par

## 2021-11-08 NOTE — HISTORY OF PRESENT ILLNESS
[FreeTextEntry1] : This is an 86-year-old woman who has had paresthesias in the soles since 2009.  She also has some balance difficulty.  She sees a  who works with her on her balance.  Over the past few years the paresthesias in the balance have worsened somewhat.  She has had multiple surgeries on the superficial vasculature in her legs.\par On October 14, 2020 1 in the evening she lost her balance and fell flat backwards.  She had no loss of consciousness.  She was shaken up.  She had some soft tissue swelling in the back of her head.  She had a CT head and cervical spine 3 days later which showed no fracture and no bleeding.  Since the fall she has had sacral pain, right hamstring pain, buttock pain, intermittent cramps.  These pains have all improved somewhat since onset.\par \par She has a history of notalgia paresthetica: Having an itchy area and hyperpigmentation in the neck posteriorly.\par

## 2021-11-08 NOTE — ASSESSMENT
[FreeTextEntry1] : This is an 86-year-old woman with gait abnormality with a recent fall.\par She continues to have low back pain: I recommend a consultation with pain management.\par She is already doing balance exercises with her .\par \par She has had paresthesias in the soles for the past 12 years.  She has no signs of a peripheral neuropathy on examination at this time.  I recommend testing to look for an underlying cause of a peripheral neuropathy; see below.\par \par I discussed in detail with the patient the diagnosis, prognosis, treatment plan and answered all of their questions.\par \par Elevated BP - asymptomatic - F/U with PMD for further management.

## 2021-11-09 ENCOUNTER — APPOINTMENT (OUTPATIENT)
Dept: PAIN MANAGEMENT | Facility: CLINIC | Age: 86
End: 2021-11-09
Payer: MEDICARE

## 2021-11-09 VITALS — HEIGHT: 65 IN | TEMPERATURE: 98.2 F | SYSTOLIC BLOOD PRESSURE: 160 MMHG | DIASTOLIC BLOOD PRESSURE: 89 MMHG

## 2021-11-09 DIAGNOSIS — M79.10 MYALGIA, UNSPECIFIED SITE: ICD-10-CM

## 2021-11-09 PROCEDURE — 99204 OFFICE O/P NEW MOD 45 MIN: CPT

## 2021-11-09 NOTE — CONSULT LETTER
[Dear  ___] : Dear  [unfilled], [Consult Letter:] : I had the pleasure of evaluating your patient, [unfilled]. [Please see my note below.] : Please see my note below. [Consult Closing:] : Thank you very much for allowing me to participate in the care of this patient.  If you have any questions, please do not hesitate to contact me. [Sincerely,] : Sincerely, [FreeTextEntry3] : Alex Grewal DO

## 2021-11-09 NOTE — HISTORY OF PRESENT ILLNESS
[Back Pain] : back pain [___ days] : [unfilled] day(s) ago [Constant] : constant [6] : a minimum pain level of 6/10 [7] : a maximum pain level of 7/10 [Aching] : aching [Sitting] : sitting [Heat] : heat [FreeTextEntry1] : HPI\par \par Ms. KARTHIK HAMPTON is a 86 year F with PHx as below, referred by Dr Hester  who presents today with chief complaint of coccygeal pain. Reports that it developed following a fall in October. It is located coccyx, slight deviation to the right;  The pain is presently 7/10 in severity on the numerical rating scale. It is sharp in nature. The pain is constant.  The pain is aggravated with sitting on hard surfacesThe pain improves with rest, standing The pain is functionally and emotionally disabling for the patient as its preventing them from going about activities of daily living, such as routine housework. The pain does not impair the patient’s ability to sleep. \par  \par Adjuvant hx for htn, hyperparathyroid, CKD, mixed connective tissues disease, PVC's, chronic ITP, notalgia paresthetica, cellulitis of LUE. Reports there is NO present numbness, there is NO weakness. Patient denies any bowel/bladder incontinence, no saddle/perineal anesthesia or any other red flag signs or symptoms. Reports regular BMs.\par    [FreeTextEntry2] : 22 [FreeTextEntry7] : Referred by Dr. Kimberli Hester. Lower back pain from a fall in her bathroom.

## 2021-11-09 NOTE — REVIEW OF SYSTEMS
[Back Pain] : back pain [Muscle Pain] : muscle pain [Negative] : Heme/Lymph [Discharge] : no discharge [Eye Pain] : no eye pain [Decrease Hearing] : no decrease in hearing [Nasal Discharge] : no nasal discharge [SOB at rest] : no shortness of breath at rest [Cough] : no cough [Abdominal Pain] : no abdominal pain [Constipation] : no constipation [Urinary Frequency] : no urinary frequency [Urinary Urgency] : no urinary urgency [Breast Pain] : no pain ~T in breast [Breast Lump] : no breast lump [Numbness] : no numbness [Headache] : no headache [Dizziness] : no dizziness [Anxiety] : no anxiety [Depression] : no depression [Feeling Weak] : not feeling weak [Muscle Weakness] : no muscle weakness [FreeTextEntry2] : weight loss [FreeTextEntry5] : pvc's [de-identified] : ITP

## 2021-11-09 NOTE — ASSESSMENT
[FreeTextEntry1] : Ms. 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 \par  \par Avoid NSAIDs due to CKD\par  \par >> Interventions\par  \par Arrange for Ganglion of Impar Block - under fluoroscopic guidance. Success rate and possible complications discussed with patient in detail. \par  \par >> Therapy and Other Modalities\par  \par Continue with daily home stretcing regimen\par \par >> Imaging and Other Studies\par \par I have personally reviewed the images in detail together with the patient today, and I have answered all questions regarding this condition to the best of my ability, to the patient's satisfaction. \par \par >> Consults\par \par None ordered

## 2021-11-09 NOTE — PHYSICAL EXAM
[de-identified] : General: Well-developed and well-nourished.  No acute distress.\par Psychiatric: Behavior was cooperative\par Head:  Normocephalic and atraumatic\par Eyes:  Sclera white. Conjunctiva and eyelids pink and moist without discharge.\par Cardiovascular:  Regular\par Respiratory:  Trachea midline. Normal effort.\par No accessory muscle use with respiration\par Lumbar Spine: No pain with extension,   normal ROM\par ++ tenderness about the coccyx reproducing pain\par Extremities: No edema \par Musculoskeletal: Moving all extremities freely\par SLR negative\par Neuro: CN 2-12 Grossly intact\par Sensation to light touch is intact extremities\par Gait: Ambulates without assistive device.

## 2021-11-09 NOTE — DATA REVIEWED
[FreeTextEntry1] : Exam: XR L S SPINE 4 OR MORE VWS (C); XR PELVIS 1 OR 2 VIEW (COMMON) \par \par Order#: [id number]; [id number] \par \par \par \par EXAM: XR lumbar spine, multiple views, XR pelvis, multiple views \par \par  INDICATION: Lumbar back pain, pelvic pain \par \par  TECHNIQUE: AP, bilateral feet and lateral radiographs of lumbar spine obtained. AP radiographs of \par pelvis obtained. \par \par  COMPARISON: None available \par \par  FINDINGS: \par \par  5 nonrib-bearing lumbar-type vertebral bodies are noted. For purposes of report, inferior-most \par well-formed intervertebral disc space will be designated as L5-S1. No radiographic evidence for \par transitional lumbosacral anatomy. \par \par  Moderate dextrocurvature centered at the lower lumbar spine. Lumbar vertebral body heights are \par maintained. No significant spondylolisthesis. There are mild multilevel degenerative endplate \par changes with osteophyte formation, intervertebral disc space narrowing and endplate irregularity. \par Sacrum is obscured by bowel gas. Osteopenia. \par \par  Atherosclerotic calcifications of the abdominal aorta. \par \par  No acute displaced fracture or dislocation identified in the visualized pelvis. There are bilateral\par degenerative changes of the hips with joint space narrowing, osteophytic formation and subchondral \par sclerosis. \par \par \par \par  IMPRESSION: \par \par  No radiographic evidence for lumbar compression fracture or acute displaced fracture of the pelvis. \par  Please note that, relative to radiography, MR spine imaging is a more sensitive imaging

## 2021-11-10 ENCOUNTER — TRANSCRIPTION ENCOUNTER (OUTPATIENT)
Age: 86
End: 2021-11-10

## 2021-11-10 LAB
ALBUMIN MFR SERPL ELPH: 57.4 %
ALBUMIN SERPL-MCNC: 4.5 G/DL
ALBUMIN/GLOB SERPL: 1.4 RATIO
ALBUPE: 73.3 %
ALPHA1 GLOB MFR SERPL ELPH: 5.1 %
ALPHA1 GLOB SERPL ELPH-MCNC: 0.4 G/DL
ALPHA1UPE: 8.3 %
ALPHA2 GLOB MFR SERPL ELPH: 12.9 %
ALPHA2 GLOB SERPL ELPH-MCNC: 1 G/DL
ALPHA2UPE: 5.5 %
B-GLOBULIN MFR SERPL ELPH: 10.6 %
B-GLOBULIN SERPL ELPH-MCNC: 0.8 G/DL
BETAUPE: 6.5 %
CREAT 24H UR-MCNC: NORMAL G/24 H
CREATININE UR (MAYO): 120 MG/DL
ESTIMATED AVERAGE GLUCOSE: 117 MG/DL
GAMMA GLOB FLD ELPH-MCNC: 1.1 G/DL
GAMMA GLOB MFR SERPL ELPH: 14 %
GAMMAUPE: 6.4 %
HBA1C MFR BLD HPLC: 5.7 %
IGA 24H UR QL IFE: NORMAL
INTERPRETATION SERPL IEP-IMP: NORMAL
KAPPA LC 24H UR QL: NORMAL
M PROTEIN SPEC IFE-MCNC: NORMAL
PROT PATTERN 24H UR ELPH-IMP: NORMAL
PROT SERPL-MCNC: 7.8 G/DL
PROT SERPL-MCNC: 7.8 G/DL
PROT UR-MCNC: 157 MG/DL
PROT UR-MCNC: 157 MG/DL

## 2021-11-11 ENCOUNTER — RESULT REVIEW (OUTPATIENT)
Age: 86
End: 2021-11-11

## 2021-11-11 ENCOUNTER — APPOINTMENT (OUTPATIENT)
Dept: NEPHROLOGY | Facility: CLINIC | Age: 86
End: 2021-11-11
Payer: MEDICARE

## 2021-11-11 ENCOUNTER — NON-APPOINTMENT (OUTPATIENT)
Age: 86
End: 2021-11-11

## 2021-11-11 VITALS
SYSTOLIC BLOOD PRESSURE: 180 MMHG | WEIGHT: 125 LBS | BODY MASS INDEX: 20.83 KG/M2 | TEMPERATURE: 97.8 F | HEIGHT: 65 IN | OXYGEN SATURATION: 99 % | DIASTOLIC BLOOD PRESSURE: 80 MMHG | HEART RATE: 79 BPM

## 2021-11-11 PROCEDURE — P9615: CPT

## 2021-11-11 PROCEDURE — 99204 OFFICE O/P NEW MOD 45 MIN: CPT

## 2021-11-11 NOTE — ASSESSMENT
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 Underlying CKD stage 3 likely due to long-standing hypertension. Adequate bp control . Avoid nephrotoxins/NSAIDs. \par \par #Proteinuria with upcr 1.1 - negative MM workup w/ negative SPEP, IFx, UPEP. Will repeat upcr today and consider adding ACE/ARBs.\par \par #Hypertension - sbp 170-180 mmHg in the office, not well controlled, on amlodipine 2.5 mg po bid - better bp control needed, recommended amlodipine 5 mg po qam, monitor bp at home, sbp goal in 120's (<130/80 mmHg) having worsening renal function and proteinuria. Once renal function remains stable will consider adding ACE/ARBs.\par \par #R UPJ stenosis - abdominal CT scan w/o contrast in Sep 2021 showing right UPJ stenosis multiple renal cysts with no loss of renal parenchyma. Urologist Dr Luc Glynn is considering renal nuclear scan.\par \par #Multiple renal cysts - Urologist Dr Luc Glynn is following.\par \par #HPTH - no Vit D and calcium supplements recommended, follow up with Endocrinologist. \par \par #Osteoporosis - currently on Fosamax weekly, once eGFR <30 ml/min medication needs to be discontinued.

## 2021-11-11 NOTE — HISTORY OF PRESENT ILLNESS
[FreeTextEntry1] : 87 yo woman with PMHx of HTN, HPTH,  Stenosis of UPJ, Myalgia, Thyroid nodule, Osteoporosis presents today for evaluation of renal function.\par \par denies recent acute illnesses or infections; ER visit 10/17/21 s/p fall, had CT head/spine done; no changes in appetite, but admits to unintentional weight loss 8-10 lbs over past year, denies cp, sob, n/v/d or constipation, abdominal or flank pain, dysuria, hematuria or frothy urine, no edema, no bony aches. \par increased urinary frequency evaluated by Urologist with hypertrophied bladder wall noted.\par denies hx pyelonephritis, kidney stones, diabetes or relevant family hx.\par no recent IV contrast exposure or chronic NSAIDs use.\par \par Rising creatinine up to 1.7 noted after episode of left forearm cellulitis treated with Bactrim that decreased down to 1.3 and remains stable, proteinuria with upcr 1.1, negative MM workup w/ negative SPEP, IFx, UPEP.\par \par Abdominal CT scan w/o contrast in Sep 2021 showing right UPJ stenosis multiple renal cysts with no loss of renal parenchyma. Urologist Dr Luc Glynn is considering renal nuclear scan.\par

## 2021-11-11 NOTE — PHYSICAL EXAM
[General Appearance - Alert] : alert [General Appearance - In No Acute Distress] : in no acute distress [General Appearance - Well Nourished] : well nourished [General Appearance - Well Developed] : well developed [Extraocular Movements] : extraocular movements were intact [Neck Appearance] : the appearance of the neck was normal [Jugular Venous Distention Increased] : there was no jugular-venous distention [] : no respiratory distress [Respiration, Rhythm And Depth] : normal respiratory rhythm and effort [Exaggerated Use Of Accessory Muscles For Inspiration] : no accessory muscle use [Auscultation Breath Sounds / Voice Sounds] : lungs were clear to auscultation bilaterally [Heart Rate And Rhythm] : heart rate was normal and rhythm regular [Edema] : there was no peripheral edema [Heart Sounds] : normal S1 and S2 [No CVA Tenderness] : no ~M costovertebral angle tenderness [Abnormal Walk] : normal gait [Skin Turgor] : normal skin turgor [Cranial Nerves] : cranial nerves 2-12 were intact [Oriented To Time, Place, And Person] : oriented to person, place, and time [FreeTextEntry1] : bilateral

## 2021-11-23 ENCOUNTER — APPOINTMENT (OUTPATIENT)
Dept: PAIN MANAGEMENT | Facility: HOSPITAL | Age: 86
End: 2021-11-23

## 2021-11-23 ENCOUNTER — APPOINTMENT (OUTPATIENT)
Dept: UROLOGY | Facility: CLINIC | Age: 86
End: 2021-11-23
Payer: MEDICARE

## 2021-11-23 PROCEDURE — 99214 OFFICE O/P EST MOD 30 MIN: CPT

## 2021-11-23 NOTE — ASSESSMENT
[FreeTextEntry1] : This is a followup visit for this 86 the patient has long-standing hypertension and insufficiency stage III\par Her creatinine clearance at the best level in January of 2020 with 40 she then had a decrease in renal function after she had an allergic reaction to medication given for a skin rash\par Creatinine clearance spell that is recovering now and is currently up to about 35\par During his evaluation he had a CT scan in the absence of any symptomatology from kidney we found the UPJ stenosis\par The patient is now scheduled for a renal scan with Lasix to see if there is obstruction to the kidney possibly contributing to her renal insufficiency\par

## 2021-11-29 ENCOUNTER — TRANSCRIPTION ENCOUNTER (OUTPATIENT)
Age: 86
End: 2021-11-29

## 2021-11-30 ENCOUNTER — TRANSCRIPTION ENCOUNTER (OUTPATIENT)
Age: 86
End: 2021-11-30

## 2021-11-30 DIAGNOSIS — R92.2 INCONCLUSIVE MAMMOGRAM: ICD-10-CM

## 2021-12-01 ENCOUNTER — TRANSCRIPTION ENCOUNTER (OUTPATIENT)
Age: 86
End: 2021-12-01

## 2021-12-09 ENCOUNTER — NON-APPOINTMENT (OUTPATIENT)
Age: 86
End: 2021-12-09

## 2021-12-14 ENCOUNTER — RESULT REVIEW (OUTPATIENT)
Age: 86
End: 2021-12-14

## 2021-12-14 ENCOUNTER — APPOINTMENT (OUTPATIENT)
Dept: NEPHROLOGY | Facility: CLINIC | Age: 86
End: 2021-12-14
Payer: MEDICARE

## 2021-12-14 VITALS
OXYGEN SATURATION: 98 % | WEIGHT: 125 LBS | SYSTOLIC BLOOD PRESSURE: 138 MMHG | RESPIRATION RATE: 18 BRPM | HEIGHT: 65 IN | BODY MASS INDEX: 20.83 KG/M2 | DIASTOLIC BLOOD PRESSURE: 70 MMHG | HEART RATE: 80 BPM | TEMPERATURE: 97.2 F

## 2021-12-14 DIAGNOSIS — Q61.02 CONGENITAL MULTIPLE RENAL CYSTS: ICD-10-CM

## 2021-12-14 PROCEDURE — 99214 OFFICE O/P EST MOD 30 MIN: CPT

## 2021-12-15 ENCOUNTER — APPOINTMENT (OUTPATIENT)
Dept: NEPHROLOGY | Facility: CLINIC | Age: 86
End: 2021-12-15

## 2021-12-16 ENCOUNTER — TRANSCRIPTION ENCOUNTER (OUTPATIENT)
Age: 86
End: 2021-12-16

## 2021-12-21 ENCOUNTER — RESULT REVIEW (OUTPATIENT)
Age: 86
End: 2021-12-21

## 2021-12-21 ENCOUNTER — APPOINTMENT (OUTPATIENT)
Dept: NEPHROLOGY | Facility: CLINIC | Age: 86
End: 2021-12-21
Payer: MEDICARE

## 2021-12-21 VITALS
OXYGEN SATURATION: 99 % | DIASTOLIC BLOOD PRESSURE: 70 MMHG | WEIGHT: 125 LBS | SYSTOLIC BLOOD PRESSURE: 160 MMHG | HEIGHT: 65 IN | TEMPERATURE: 96.6 F | BODY MASS INDEX: 20.83 KG/M2 | HEART RATE: 72 BPM

## 2021-12-21 PROCEDURE — 99214 OFFICE O/P EST MOD 30 MIN: CPT

## 2021-12-21 RX ORDER — SODIUM POLYSTYRENE SULFONATE 15 G/60ML
15 SUSPENSION ORAL; RECTAL
Qty: 600 | Refills: 0 | Status: ACTIVE | COMMUNITY
Start: 2021-12-21 | End: 1900-01-01

## 2021-12-21 NOTE — ASSESSMENT
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 Cystatin C eGFR and  Cr eGFR w/o discrepancy at 36-37 ml/min. Adequate bp control with bp goal <130/80. Avoid nephrotoxins/NSAIDs/Contrast. Low protein diet \par  \par #Hyperkalemia - patient admits to eating banana, avocado, prunes - discussed with the patient healthy balanced diet with low potassium intake. Will see Nutritionist Dr Tear Granado\par s/p SPS 15 g po x1 for K at 5.5 - will repeat level today \par \par #Proteinuria with upcr 1.1 --> 1.3 --> 0.8 - negative MM workup w/ negative SPEP, IFx, UPEP. Will consider adding ACE/ARBs once hyperK under control \par \par #Urinary frequency - will repeat urinalysis w/ micro for UTI and urine cultures\par \par #Hypertension - amlodipine 5 mg po daily - will continue, as well as monitoring bp at home, sbp goal <130/80 mmHg. Once renal function remains stable and hyperkalemia controlled will consider adding ACE/ARBs, discussed with the patient, lisinopril vs losartan\par \par #LE edema - could be due to amlodipine, will check baseline pro-BNP, will consider adding diuretics \par \par #R UPJ stenosis - abdominal CT scan w/o contrast in Sep 2021 showing right UPJ stenosis multiple renal cysts with no loss of renal parenchyma. Urologist Dr Luc Glynn\par s/p NM renal scan on 12/10/21 - showing symmetrical renal perfusion, R kidney 31% and L kidney 69% tracer activity; no evidence of high-grade R UPJ obstruction\par patient is considering local Urologist care, looking for female provider \par \par #Multiple renal cysts - Urologist to follow \par \par #HPTH - no Vit D and calcium supplements recommended, follow up with Endocrinologist\par \par #Osteoporosis - current eGFR 36-37 ml/min, off Fosamax that is contraindicated with eGFR <35 ml/min, and patient is very close to that point; Actonel that is contraindicated with eGFR <30 ml/min might be considered instead\par \par will call with results

## 2021-12-21 NOTE — PHYSICAL EXAM
[General Appearance - Alert] : alert [General Appearance - In No Acute Distress] : in no acute distress [General Appearance - Well Nourished] : well nourished [General Appearance - Well Developed] : well developed [Extraocular Movements] : extraocular movements were intact [Neck Appearance] : the appearance of the neck was normal [Jugular Venous Distention Increased] : there was no jugular-venous distention [] : no respiratory distress [Respiration, Rhythm And Depth] : normal respiratory rhythm and effort [Exaggerated Use Of Accessory Muscles For Inspiration] : no accessory muscle use [Auscultation Breath Sounds / Voice Sounds] : lungs were clear to auscultation bilaterally [Heart Rate And Rhythm] : heart rate was normal and rhythm regular [Heart Sounds] : normal S1 and S2 [No CVA Tenderness] : no ~M costovertebral angle tenderness [Abnormal Walk] : normal gait [Skin Turgor] : normal skin turgor [Cranial Nerves] : cranial nerves 2-12 were intact [Oriented To Time, Place, And Person] : oriented to person, place, and time [Musculoskeletal - Swelling] : no joint swelling seen [FreeTextEntry1] : bilateral

## 2021-12-21 NOTE — HISTORY OF PRESENT ILLNESS
[FreeTextEntry1] : 85 yo woman with PMHx of HTN, HPTH, Stenosis of UPJ, Myalgia, Thyroid nodule, Osteoporosis if following for hypertension, hyperkalemia, chronic kidney disease. \par \par \par denies recent fever, chills or any infections; \par denies n/v/d, no dysuria, hematuria or frothy urine\par +trace LE edema \par +increased urinary frequency but no pain or burning sensation \par evaluated by Urologist with hypertrophied bladder wall noted\par \par currently on low potassium diet for hyperkalemia at 5.5 (used to eat banana, avocado, prunes), s/p 15 g SPS x1\par \par hx of rising creatinine up to 1.7 noted after episode of left forearm cellulitis treated with Bactrim that decreased down to 1.3 and remains stable, proteinuria with upcr 1.1 --> 1.3 --0.8\par \par Negative MM workup w/ negative SPEP, IFx, UPEP\par \par Fosamax held in setting of rising creatinine for Osteoporosis \par \par BP log at home - am bp in 115-125-130/60-70's, pm bp mainly in 120-130's/70's - on amlodipine 5 mg qd \par /70 mmHg in the office today and patient states it's always like that in hospital/ medical office environment\par \par Abdominal CT scan w/o contrast in Sep 2021 showing right UPJ stenosis multiple renal cysts with no loss of renal parenchyma. Seeing by Urologist Dr Luc Glynn\par \par s/p NM renal scan on 12/10/21 - showing symmetrical renal perfusion, R kidney 31% and L kidney 69% tracer activity; no evidence of high-grade R UPJ obstruction

## 2021-12-23 PROBLEM — Q61.02 MULTIPLE RENAL CYSTS: Status: ACTIVE | Noted: 2021-11-11

## 2021-12-23 NOTE — PHYSICAL EXAM
[General Appearance - Alert] : alert [General Appearance - In No Acute Distress] : in no acute distress [General Appearance - Well Nourished] : well nourished [General Appearance - Well Developed] : well developed [Extraocular Movements] : extraocular movements were intact [Neck Appearance] : the appearance of the neck was normal [Jugular Venous Distention Increased] : there was no jugular-venous distention [] : no respiratory distress [Respiration, Rhythm And Depth] : normal respiratory rhythm and effort [Exaggerated Use Of Accessory Muscles For Inspiration] : no accessory muscle use [Auscultation Breath Sounds / Voice Sounds] : lungs were clear to auscultation bilaterally [Heart Rate And Rhythm] : heart rate was normal and rhythm regular [Heart Sounds] : normal S1 and S2 [Edema] : there was no peripheral edema [No CVA Tenderness] : no ~M costovertebral angle tenderness [Abnormal Walk] : normal gait [Skin Turgor] : normal skin turgor [Cranial Nerves] : cranial nerves 2-12 were intact [Oriented To Time, Place, And Person] : oriented to person, place, and time [FreeTextEntry1] : bilateral

## 2021-12-23 NOTE — HISTORY OF PRESENT ILLNESS
[FreeTextEntry1] : 87 yo woman with PMHx of HTN, HPTH, Stenosis of UPJ, Myalgia, Thyroid nodule, Osteoporosis if following for chronic kidney disease and hypertension \par \par \par denies recent fever, chills or any infections; \par denies n/v/d, no dysuria, hematuria or frothy urine, no edema\par increased urinary frequency evaluated by Urologist with hypertrophied bladder wall noted\par denies hx pyelonephritis, kidney stones, diabetes or relevant family hx\par no recent IV contrast exposure or chronic NSAIDs use\par Patient admits to eating high protein and high potassium diet, likes banana, avocado, prunes \par \par Rising creatinine up to 1.7 noted after episode of left forearm cellulitis treated with Bactrim that decreased down to 1.3 and remains stable, proteinuria with upcr 1.1 --> 1.3\par \par Potassium within normal range but tends to run on higher site, in 5.2-5.3\par \par Negative MM workup w/ negative SPEP, IFx, UPEP\par \par Continue holding Fosamax in setting of rising creatinine \par \par BP log at home - am bp in 115-125-130/60-70's, pm bp mainly in 120-130's/70's - on amlodipine 5 mg qd trying to am vs pm dosing given generalized weakness that as per patient possibly contributes to this medication \par /70 mmHg in the office today \par \par Abdominal CT scan w/o contrast in Sep 2021 showing right UPJ stenosis multiple renal cysts with no loss of renal parenchyma. Seeing by Urologist Dr Luc Glynn\par \par s/p NM renal scan on 12/10/21 - showing symmetrical renal perfusion, R kidney 31% and L kidney 69% tracer activity; no evidence of high-grade R UPJ obstruction

## 2021-12-23 NOTE — ASSESSMENT
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 Will repeat renal panel today with urinalysis w/ micro, urine pcr \par \par #CKD stage 3 likely due to long-standing hypertension. Adequate bp control . Avoid nephrotoxins/NSAIDs. \par Discussed low protein diet \par Potassium within normal range but tends to run on higher site, in 5.2-5.3 - patient admits to eating banana, avocado, prunes - discussed with the patient healthy balanced diet with potassium intake in moderation \par Print out of chronic kidney disease given to the patient as well\par \par #Proteinuria with upcr 1.1 --> 1.3 - negative MM workup w/ negative SPEP, IFx, UPEP. Will repeat upcr today and consider adding ACE/ARBs, discussed with the patient \par \par #Hypertension - better controlled with amlodipine 5 mg po daily - will continue, as well as monitoring bp at home, sbp goal in 120's (<130/80 mmHg) given worsening renal function and proteinuria. Once renal function remains stable will consider adding ACE/ARBs, discussed with the patient, lisinopril vs losartan.\par \par #R UPJ stenosis - abdominal CT scan w/o contrast in Sep 2021 showing right UPJ stenosis multiple renal cysts with no loss of renal parenchyma. Urologist Dr Luc Glynn\par s/p NM renal scan on 12/10/21 - showing symmetrical renal perfusion, R kidney 31% and L kidney 69% tracer activity; no evidence of high-grade R UPJ obstruction\par patient is considering local Urologist care, list of providers given\par \par #Multiple renal cysts - patient is considering local Urologist care, list of providers given\par \par #HPTH - no Vit D and calcium supplements recommended, follow up with Endocrinologist. \par \par #Osteoporosis - currently off Fosamax weekly, once eGFR >30 ml/min and stable will consider to restart \par \par follow up in 1 week

## 2022-01-06 LAB
ALBUMIN SERPL ELPH-MCNC: 4.7 G/DL
ALBUMIN SERPL ELPH-MCNC: 4.9 G/DL
ALP BLD-CCNC: 65 U/L
ALP BLD-CCNC: 76 U/L
ALT SERPL-CCNC: 18 U/L
ALT SERPL-CCNC: 21 U/L
ANION GAP SERPL CALC-SCNC: 12 MMOL/L
ANION GAP SERPL CALC-SCNC: 15 MMOL/L
AST SERPL-CCNC: 27 U/L
AST SERPL-CCNC: 28 U/L
BILIRUB SERPL-MCNC: 0.2 MG/DL
BILIRUB SERPL-MCNC: 0.3 MG/DL
BUN SERPL-MCNC: 36 MG/DL
BUN SERPL-MCNC: 39 MG/DL
CALCIUM SERPL-MCNC: 10.3 MG/DL
CALCIUM SERPL-MCNC: 10.5 MG/DL
CHLORIDE SERPL-SCNC: 105 MMOL/L
CHLORIDE SERPL-SCNC: 105 MMOL/L
CO2 SERPL-SCNC: 21 MMOL/L
CO2 SERPL-SCNC: 23 MMOL/L
CREAT SERPL-MCNC: 1.33 MG/DL
CREAT SERPL-MCNC: 1.37 MG/DL
GLUCOSE SERPL-MCNC: 103 MG/DL
GLUCOSE SERPL-MCNC: 117 MG/DL
POTASSIUM SERPL-SCNC: 4.8 MMOL/L
POTASSIUM SERPL-SCNC: 5.5 MMOL/L
PROT SERPL-MCNC: 7.3 G/DL
PROT SERPL-MCNC: 7.5 G/DL
SODIUM SERPL-SCNC: 140 MMOL/L
SODIUM SERPL-SCNC: 140 MMOL/L

## 2022-01-13 ENCOUNTER — TRANSCRIPTION ENCOUNTER (OUTPATIENT)
Age: 87
End: 2022-01-13

## 2022-01-18 ENCOUNTER — TRANSCRIPTION ENCOUNTER (OUTPATIENT)
Age: 87
End: 2022-01-18

## 2022-01-19 ENCOUNTER — TRANSCRIPTION ENCOUNTER (OUTPATIENT)
Age: 87
End: 2022-01-19

## 2022-02-09 ENCOUNTER — TRANSCRIPTION ENCOUNTER (OUTPATIENT)
Age: 87
End: 2022-02-09

## 2022-02-10 ENCOUNTER — TRANSCRIPTION ENCOUNTER (OUTPATIENT)
Age: 87
End: 2022-02-10

## 2022-02-24 ENCOUNTER — RESULT REVIEW (OUTPATIENT)
Age: 87
End: 2022-02-24

## 2022-02-24 ENCOUNTER — APPOINTMENT (OUTPATIENT)
Dept: NEPHROLOGY | Facility: CLINIC | Age: 87
End: 2022-02-24
Payer: MEDICARE

## 2022-02-24 PROCEDURE — P9615: CPT

## 2022-02-24 PROCEDURE — 36415 COLL VENOUS BLD VENIPUNCTURE: CPT

## 2022-03-03 ENCOUNTER — APPOINTMENT (OUTPATIENT)
Dept: NEPHROLOGY | Facility: CLINIC | Age: 87
End: 2022-03-03
Payer: MEDICARE

## 2022-03-03 VITALS
WEIGHT: 125.5 LBS | BODY MASS INDEX: 20.91 KG/M2 | SYSTOLIC BLOOD PRESSURE: 174 MMHG | OXYGEN SATURATION: 98 % | TEMPERATURE: 96.9 F | HEIGHT: 65 IN | HEART RATE: 68 BPM | DIASTOLIC BLOOD PRESSURE: 84 MMHG

## 2022-03-03 PROCEDURE — 99213 OFFICE O/P EST LOW 20 MIN: CPT

## 2022-03-03 NOTE — HISTORY OF PRESENT ILLNESS
[FreeTextEntry1] : 88 yo woman with PMHx of CKD, HTN, HPTH, Stenosis of UPJ, Myalgia, Thyroid nodule, Osteoporosis is following for  chronic kidney disease, hypertension.\par \par no changes in appetite with stable weight\par on low salt, potassium and protein diet \par exercising regularly \par denies any dysuria with no pain or burning sensation \par +chronic and stable trace LE edema \par \par hx of rising creatinine up to 1.7 noted after episode of left forearm cellulitis treated with Bactrim that decreased down to 1.3 and remains stable, with proteinuria, pcr 1.1 --> 1.3 --> 0.8 -> 1.3 (2/24/22)\par \par Negative MM workup w/ negative SPEP, IFx, UPEP\par \par BP log at home - am bp remains in 117-125-130/60-70's, pm bp mainly in 120-130's/70's - on amlodipine 5 mg qd \par /70 mmHg in the office due to white coat hypertension \par \par Abdominal CT scan w/o contrast in Sep 2021 showing right UPJ stenosis multiple renal cysts with no loss of renal parenchyma. Seeing by Urologist Dr Luc Glynn\par \par s/p NM renal scan on 12/10/21 - showing symmetrical renal perfusion, R kidney 31% and L kidney 69% tracer activity; no evidence of high-grade R UPJ obstruction

## 2022-03-03 NOTE — PHYSICAL EXAM
[General Appearance - Alert] : alert [General Appearance - In No Acute Distress] : in no acute distress [Extraocular Movements] : extraocular movements were intact [Jugular Venous Distention Increased] : there was no jugular-venous distention [Respiration, Rhythm And Depth] : normal respiratory rhythm and effort [Exaggerated Use Of Accessory Muscles For Inspiration] : no accessory muscle use [Auscultation Breath Sounds / Voice Sounds] : lungs were clear to auscultation bilaterally [Heart Rate And Rhythm] : heart rate was normal and rhythm regular [Heart Sounds] : normal S1 and S2 [Abnormal Walk] : normal gait [Oriented To Time, Place, And Person] : oriented to person, place, and time [] : no rash [No Focal Deficits] : no focal deficits [FreeTextEntry1] : trace LE edema bilateral, not tender, more on right

## 2022-03-03 NOTE — ASSESSMENT
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 Adequate hydration. Low salt and protein diet. Avoid nephrotoxins/NSAIDs/Contrast. \par  \par #Hyperkalemia - resolved, continue on strict low potassium diet. Plan for ARBs initiation as below. \par \par #Proteinuria, non-nephrotic with pcr 1.1 -> 1.3 -> 0.8 -> 1.3 (2/24/22) - negative MM workup w/ negative SPEP, IFx, UPEP. Will start losartan 25 mg 1/2 tab po qd. Follow up in 2 weeks with renal panel and potassium level.\par \par #Hypertension with white coat component- continue amlodipine 5 mg po daily, will start losartan 25 mg 1/2 tab po qd, continue to monitor bp at home. SBP goal <130/80 mmHg. \par \par #HPTH - stable mild hypercalcemia, will resume Vit D 1000 iu q/other day to keep Vit D in low normal range avoiding worsening hypercalcemia, will follow \par \par \par follow up in 2 weeks with renal panel and potassium level

## 2022-04-07 ENCOUNTER — TRANSCRIPTION ENCOUNTER (OUTPATIENT)
Age: 87
End: 2022-04-07

## 2022-04-13 ENCOUNTER — TRANSCRIPTION ENCOUNTER (OUTPATIENT)
Age: 87
End: 2022-04-13

## 2022-04-13 RX ORDER — LOSARTAN POTASSIUM 25 MG/1
25 TABLET, FILM COATED ORAL DAILY
Qty: 30 | Refills: 3 | Status: DISCONTINUED | COMMUNITY
Start: 2022-03-03 | End: 2022-04-13

## 2022-04-19 ENCOUNTER — APPOINTMENT (OUTPATIENT)
Dept: NEPHROLOGY | Facility: CLINIC | Age: 87
End: 2022-04-19
Payer: MEDICARE

## 2022-04-19 ENCOUNTER — RESULT REVIEW (OUTPATIENT)
Age: 87
End: 2022-04-19

## 2022-04-19 PROCEDURE — 36415 COLL VENOUS BLD VENIPUNCTURE: CPT

## 2022-04-19 PROCEDURE — P9615: CPT

## 2022-05-31 ENCOUNTER — APPOINTMENT (OUTPATIENT)
Dept: NEPHROLOGY | Facility: CLINIC | Age: 87
End: 2022-05-31
Payer: MEDICARE

## 2022-05-31 ENCOUNTER — TRANSCRIPTION ENCOUNTER (OUTPATIENT)
Age: 87
End: 2022-05-31

## 2022-05-31 ENCOUNTER — RESULT REVIEW (OUTPATIENT)
Age: 87
End: 2022-05-31

## 2022-05-31 PROCEDURE — P9615: CPT

## 2022-06-02 ENCOUNTER — TRANSCRIPTION ENCOUNTER (OUTPATIENT)
Age: 87
End: 2022-06-02

## 2022-06-13 ENCOUNTER — APPOINTMENT (OUTPATIENT)
Dept: NEPHROLOGY | Facility: CLINIC | Age: 87
End: 2022-06-13
Payer: MEDICARE

## 2022-06-13 ENCOUNTER — RESULT REVIEW (OUTPATIENT)
Age: 87
End: 2022-06-13

## 2022-06-13 VITALS
OXYGEN SATURATION: 99 % | HEIGHT: 65 IN | HEART RATE: 78 BPM | DIASTOLIC BLOOD PRESSURE: 80 MMHG | TEMPERATURE: 97.3 F | BODY MASS INDEX: 20.83 KG/M2 | SYSTOLIC BLOOD PRESSURE: 170 MMHG | WEIGHT: 125 LBS

## 2022-06-13 PROCEDURE — 99213 OFFICE O/P EST LOW 20 MIN: CPT

## 2022-06-13 RX ORDER — ALENDRONATE SODIUM 70 MG/1
70 TABLET ORAL
Refills: 0 | Status: DISCONTINUED | COMMUNITY
Start: 2020-07-16 | End: 2022-06-13

## 2022-06-13 RX ORDER — CIPROFLOXACIN HYDROCHLORIDE 250 MG/1
250 TABLET, FILM COATED ORAL
Qty: 14 | Refills: 0 | Status: DISCONTINUED | COMMUNITY
Start: 2022-06-01 | End: 2022-06-13

## 2022-06-19 NOTE — HISTORY OF PRESENT ILLNESS
[FreeTextEntry1] : 88 yo woman with PMHx of CKD, HTN, Intermittent hyperkalemia, HPTH, Stenosis of UPJ, Myalgia, Thyroid nodule, Osteoporosis presents for follow up with chronic kidney disease.\par \par \par \par s/p course of cipro for positive UTI's with resolving symptoms\par \par white coat hypertension, as per home log bp appears to be well controlled with bp mainly in 120-130/70-80\par stable weight, no sob, n/v/d, stable trace lower extr edema\par remains on strict low potassium diet

## 2022-06-19 NOTE — ASSESSMENT
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 Adequate hydration. Low salt and protein diet. Avoid nephrotoxins/NSAIDs/Contrast. \par Labs today.\par \par #Hyperkalemia - intermittent, continue on strict low potassium diet, not on losartan. Labs today.\par \par #Proteinuria, non-nephrotic with pcr 1.1 -> 1.3 -> 0.8 -> 1.3 (2/24/22) - negative MM workup w/ negative SPEP, IFx, UPEP. Not on ACE/ ARBs yet due to intermittent hyperkalemia.\par \par #Hypertension with white coat component- continue amlodipine 5 mg po daily, low salt diet, continue to monitor bp at home. BP goal <130/80 mmHg. \par \par #UTI - s/p recent course of cipro, resolving. \par \par \par follow up in 3 months

## 2022-06-19 NOTE — PHYSICAL EXAM
[General Appearance - Alert] : alert [General Appearance - In No Acute Distress] : in no acute distress [Extraocular Movements] : extraocular movements were intact [Jugular Venous Distention Increased] : there was no jugular-venous distention [Respiration, Rhythm And Depth] : normal respiratory rhythm and effort [Exaggerated Use Of Accessory Muscles For Inspiration] : no accessory muscle use [Auscultation Breath Sounds / Voice Sounds] : lungs were clear to auscultation bilaterally [Heart Rate And Rhythm] : heart rate was normal and rhythm regular [Heart Sounds] : normal S1 and S2 [Abnormal Walk] : normal gait [] : no rash [No Focal Deficits] : no focal deficits [Oriented To Time, Place, And Person] : oriented to person, place, and time [FreeTextEntry1] : trace LE edema bilateral, not tender, more on right

## 2022-06-22 ENCOUNTER — RESULT REVIEW (OUTPATIENT)
Age: 87
End: 2022-06-22

## 2022-06-22 ENCOUNTER — APPOINTMENT (OUTPATIENT)
Dept: NEPHROLOGY | Facility: CLINIC | Age: 87
End: 2022-06-22
Payer: MEDICARE

## 2022-06-22 PROCEDURE — 36415 COLL VENOUS BLD VENIPUNCTURE: CPT

## 2022-07-05 ENCOUNTER — TRANSCRIPTION ENCOUNTER (OUTPATIENT)
Age: 87
End: 2022-07-05

## 2022-07-13 ENCOUNTER — APPOINTMENT (OUTPATIENT)
Dept: INTERNAL MEDICINE | Facility: CLINIC | Age: 87
End: 2022-07-13

## 2022-07-13 VITALS — DIASTOLIC BLOOD PRESSURE: 80 MMHG | SYSTOLIC BLOOD PRESSURE: 160 MMHG

## 2022-07-13 VITALS
DIASTOLIC BLOOD PRESSURE: 80 MMHG | SYSTOLIC BLOOD PRESSURE: 162 MMHG | WEIGHT: 124 LBS | BODY MASS INDEX: 20.66 KG/M2 | OXYGEN SATURATION: 99 % | HEART RATE: 89 BPM | HEIGHT: 65 IN | TEMPERATURE: 97.8 F

## 2022-07-13 DIAGNOSIS — R39.9 UNSPECIFIED SYMPTOMS AND SIGNS INVOLVING THE GENITOURINARY SYSTEM: ICD-10-CM

## 2022-07-13 DIAGNOSIS — M53.3 SACROCOCCYGEAL DISORDERS, NOT ELSEWHERE CLASSIFIED: ICD-10-CM

## 2022-07-13 DIAGNOSIS — N18.9 ACUTE KIDNEY FAILURE, UNSPECIFIED: ICD-10-CM

## 2022-07-13 DIAGNOSIS — R63.4 ABNORMAL WEIGHT LOSS: ICD-10-CM

## 2022-07-13 DIAGNOSIS — N17.9 ACUTE KIDNEY FAILURE, UNSPECIFIED: ICD-10-CM

## 2022-07-13 DIAGNOSIS — E04.1 NONTOXIC SINGLE THYROID NODULE: ICD-10-CM

## 2022-07-13 PROCEDURE — 99214 OFFICE O/P EST MOD 30 MIN: CPT | Mod: 25

## 2022-07-13 PROCEDURE — 36415 COLL VENOUS BLD VENIPUNCTURE: CPT

## 2022-07-13 NOTE — HISTORY OF PRESENT ILLNESS
[de-identified] : Roslyn comes in today for medical follow-up after seen by endocrinologist for her hyperparathyroidism and left thyroid nodule.  She had a biopsy of the nodule yesterday results are still pending.  She has been following her kidney function with Dr. Pradhan  and she was on potassium lowering agent few times within normal potassium with the latest lab work.

## 2022-07-13 NOTE — REVIEW OF SYSTEMS
[Joint Swelling] : no joint swelling [Muscle Weakness] : no muscle weakness [Muscle Pain] : no muscle pain [Negative] : Genitourinary

## 2022-07-14 LAB
ANION GAP SERPL CALC-SCNC: 11 MMOL/L
BUN SERPL-MCNC: 40 MG/DL
CALCIUM SERPL-MCNC: 10.5 MG/DL
CHLORIDE SERPL-SCNC: 104 MMOL/L
CO2 SERPL-SCNC: 24 MMOL/L
CREAT SERPL-MCNC: 1.5 MG/DL
EGFR: 34 ML/MIN/1.73M2
GLUCOSE SERPL-MCNC: 128 MG/DL
POTASSIUM SERPL-SCNC: 5.3 MMOL/L
SODIUM SERPL-SCNC: 139 MMOL/L

## 2022-07-15 ENCOUNTER — TRANSCRIPTION ENCOUNTER (OUTPATIENT)
Age: 87
End: 2022-07-15

## 2022-08-09 ENCOUNTER — RESULT REVIEW (OUTPATIENT)
Age: 87
End: 2022-08-09

## 2022-08-09 ENCOUNTER — APPOINTMENT (OUTPATIENT)
Dept: NEPHROLOGY | Facility: CLINIC | Age: 87
End: 2022-08-09

## 2022-08-09 PROCEDURE — P9615: CPT

## 2022-08-09 PROCEDURE — 36415 COLL VENOUS BLD VENIPUNCTURE: CPT

## 2022-08-31 ENCOUNTER — APPOINTMENT (OUTPATIENT)
Dept: NEPHROLOGY | Facility: CLINIC | Age: 87
End: 2022-08-31

## 2022-08-31 PROCEDURE — 93784 AMBL BP MNTR W/SOFTWARE: CPT

## 2022-09-15 ENCOUNTER — APPOINTMENT (OUTPATIENT)
Dept: NEPHROLOGY | Facility: CLINIC | Age: 87
End: 2022-09-15

## 2022-09-15 VITALS
DIASTOLIC BLOOD PRESSURE: 70 MMHG | HEIGHT: 65 IN | BODY MASS INDEX: 20.66 KG/M2 | TEMPERATURE: 97 F | HEART RATE: 84 BPM | WEIGHT: 124 LBS | OXYGEN SATURATION: 98 % | SYSTOLIC BLOOD PRESSURE: 160 MMHG

## 2022-09-15 PROCEDURE — 99214 OFFICE O/P EST MOD 30 MIN: CPT

## 2022-09-15 NOTE — PHYSICAL EXAM
[General Appearance - Alert] : alert [General Appearance - In No Acute Distress] : in no acute distress [Extraocular Movements] : extraocular movements were intact [Jugular Venous Distention Increased] : there was no jugular-venous distention [Respiration, Rhythm And Depth] : normal respiratory rhythm and effort [Exaggerated Use Of Accessory Muscles For Inspiration] : no accessory muscle use [Auscultation Breath Sounds / Voice Sounds] : lungs were clear to auscultation bilaterally [Heart Rate And Rhythm] : heart rate was normal and rhythm regular [Heart Sounds] : normal S1 and S2 [Abnormal Walk] : normal gait [] : no rash [No Focal Deficits] : no focal deficits [Oriented To Time, Place, And Person] : oriented to person, place, and time [Edema] : there was no peripheral edema [No CVA Tenderness] : no ~M costovertebral angle tenderness [FreeTextEntry1] : warm and dry

## 2022-09-15 NOTE — HISTORY OF PRESENT ILLNESS
[FreeTextEntry1] : 86 yo woman with PMHx of CKD, HTN, Intermittent hyperkalemia, HPTH, Osteoporosis, Stenosis of right UPJ, Myalgia, Thyroid nodule is following with chronic kidney disease and hypertension.\par \par no acute interim events\par plan for parathyroidectomy by Dr Etta Menon at [facility] on 10/26/22\par doppler carotid on 9/21/22, following by Cardio\par prolia inj on 9/23/22, following by Endo \par plan for flu shot and covid booster \par \par white coat hypertension, as per home log bp appears to be well controlled with am bp mainly in 110-120/60-70 and pm bp mostly in 120/60's \par weight is stable, keeps strict low potassium diet\par admits to decreased oral fluid intake from 60 oz down to 45 oz\par denies any active complaints at present\par undergoing physical therapy for knee arthritis, denies any NSAIDs use\par no changes in medications \par

## 2022-09-15 NOTE — ASSESSMENT
[FreeTextEntry1] : 86 yo woman with PMHx of CKD, HTN, Intermittent hyperkalemia, HPTH, Osteoporosis, Stenosis of right UPJ, Myalgia, Thyroid nodule is following with chronic kidney disease and hypertension.\par \par \par 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/min) with non-nephrotic proteinuria, baseline creatinine 1.2-1.3, 1.5 (7/13/22) -> 1.4 (8/9/22) with prerenal FeNa, low urine sodium; stable proteinuria, upcr 0.79; no casts\par - ckd likely due to long-standing hypertension\par - improve hydration up to 2.0 L a day \par - patient will take strict in and outs for 24hr \par - continue low salt and protein diet\par - avoid nephrotoxins/NSAIDs/Contrast\par - renally adjusted meds\par \par #Right UPJ stenosis - with nuclear scan in 12/2021 noted for left kidney 69%, right kidney 31%\par - followed by Urology, no intervention \par \par #Hyperkalemia - intermittent\par - continue on strict low potassium diet\par - SPS as needed\par - avoid ACE/ARB/ potassium-sparing diuretic \par \par #Proteinuria, non-nephrotic with pcr 0.79 (8/9/22), stable \par - negative MM workup w/ negative SPEP, IFx, UPEP\par - avoid ACE/ ARBs due to intermittent hyperkalemia\par \par #Hypertension with white coat component - home bp log reviewed \par - continue amlodipine 5 mg po daily\par - continue low salt diet\par - continue home bp monitoring\par - BP goal <130/80 mmHg \par \par #HPTH/ Osteoporosis - worsening of bone density\par - plan for parathyroidectomy by Dr Etta Menon at [facility] on 10/26/22\par - plan for prolia inj on 9/23/22\par - following by Endocrinologist \par \par follow up in 2 months

## 2022-10-04 ENCOUNTER — APPOINTMENT (OUTPATIENT)
Dept: NEPHROLOGY | Facility: CLINIC | Age: 87
End: 2022-10-04

## 2022-10-04 ENCOUNTER — RESULT REVIEW (OUTPATIENT)
Age: 87
End: 2022-10-04

## 2022-10-04 PROCEDURE — P9615: CPT

## 2022-10-06 ENCOUNTER — TRANSCRIPTION ENCOUNTER (OUTPATIENT)
Age: 87
End: 2022-10-06

## 2022-10-12 ENCOUNTER — APPOINTMENT (OUTPATIENT)
Dept: INTERNAL MEDICINE | Facility: CLINIC | Age: 87
End: 2022-10-12

## 2022-10-12 VITALS
OXYGEN SATURATION: 97 % | HEIGHT: 65 IN | WEIGHT: 124 LBS | HEART RATE: 76 BPM | SYSTOLIC BLOOD PRESSURE: 144 MMHG | BODY MASS INDEX: 20.66 KG/M2 | TEMPERATURE: 97.8 F | DIASTOLIC BLOOD PRESSURE: 90 MMHG

## 2022-10-12 VITALS — SYSTOLIC BLOOD PRESSURE: 140 MMHG | DIASTOLIC BLOOD PRESSURE: 86 MMHG

## 2022-10-12 PROCEDURE — 99214 OFFICE O/P EST MOD 30 MIN: CPT | Mod: 25

## 2022-10-12 PROCEDURE — 36415 COLL VENOUS BLD VENIPUNCTURE: CPT

## 2022-10-12 RX ORDER — CEPHALEXIN 500 MG/1
500 CAPSULE ORAL
Qty: 14 | Refills: 0 | Status: DISCONTINUED | COMMUNITY
Start: 2022-10-04 | End: 2022-10-12

## 2022-10-12 RX ORDER — SODIUM POLYSTYRENE SULFONATE 15 G/60ML
15 SUSPENSION ORAL; RECTAL
Qty: 3 | Refills: 0 | Status: DISCONTINUED | COMMUNITY
Start: 2021-12-15 | End: 2022-10-12

## 2022-10-12 RX ORDER — DENOSUMAB 60 MG/ML
60 INJECTION SUBCUTANEOUS
Qty: 1 | Refills: 1 | Status: ACTIVE | COMMUNITY
Start: 2022-10-12

## 2022-10-13 ENCOUNTER — LABORATORY RESULT (OUTPATIENT)
Age: 87
End: 2022-10-13

## 2022-10-13 LAB
ANION GAP SERPL CALC-SCNC: 13 MMOL/L
BASOPHILS # BLD AUTO: 0.06 K/UL
BASOPHILS NFR BLD AUTO: 0.8 %
BUN SERPL-MCNC: 43 MG/DL
CALCIUM SERPL-MCNC: 10.6 MG/DL
CHLORIDE SERPL-SCNC: 105 MMOL/L
CO2 SERPL-SCNC: 21 MMOL/L
CREAT SERPL-MCNC: 1.31 MG/DL
EGFR: 40 ML/MIN/1.73M2
EOSINOPHIL # BLD AUTO: 0.03 K/UL
EOSINOPHIL NFR BLD AUTO: 0.4 %
GLUCOSE SERPL-MCNC: 102 MG/DL
HCT VFR BLD CALC: 39.2 %
HGB BLD-MCNC: 12.2 G/DL
IMM GRANULOCYTES NFR BLD AUTO: 0.3 %
LYMPHOCYTES # BLD AUTO: 2.4 K/UL
LYMPHOCYTES NFR BLD AUTO: 30.7 %
MAN DIFF?: NORMAL
MCHC RBC-ENTMCNC: 31.1 GM/DL
MCHC RBC-ENTMCNC: 31.9 PG
MCV RBC AUTO: 102.3 FL
MONOCYTES # BLD AUTO: 0.68 K/UL
MONOCYTES NFR BLD AUTO: 8.7 %
NEUTROPHILS # BLD AUTO: 4.62 K/UL
NEUTROPHILS NFR BLD AUTO: 59.1 %
PLATELET # BLD AUTO: 166 K/UL
POTASSIUM SERPL-SCNC: 5.6 MMOL/L
RBC # BLD: 3.83 M/UL
RBC # FLD: 13 %
SODIUM SERPL-SCNC: 139 MMOL/L
WBC # FLD AUTO: 7.81 K/UL

## 2022-10-14 ENCOUNTER — TRANSCRIPTION ENCOUNTER (OUTPATIENT)
Age: 87
End: 2022-10-14

## 2022-10-14 LAB
APPEARANCE: CLEAR
BILIRUBIN URINE: NEGATIVE
BLOOD URINE: NEGATIVE
COLOR: NORMAL
GLUCOSE QUALITATIVE U: NEGATIVE
KETONES URINE: NEGATIVE
LEUKOCYTE ESTERASE URINE: ABNORMAL
NITRITE URINE: NEGATIVE
PH URINE: 6
PROTEIN URINE: ABNORMAL
SPECIFIC GRAVITY URINE: 1.02
UROBILINOGEN URINE: NORMAL

## 2022-10-17 ENCOUNTER — TRANSCRIPTION ENCOUNTER (OUTPATIENT)
Age: 87
End: 2022-10-17

## 2022-10-18 ENCOUNTER — RESULT REVIEW (OUTPATIENT)
Age: 87
End: 2022-10-18

## 2022-10-18 ENCOUNTER — APPOINTMENT (OUTPATIENT)
Dept: NEPHROLOGY | Facility: CLINIC | Age: 87
End: 2022-10-18

## 2022-10-18 PROCEDURE — 36415 COLL VENOUS BLD VENIPUNCTURE: CPT

## 2022-10-19 NOTE — HISTORY OF PRESENT ILLNESS
[No Pertinent Cardiac History] : no history of aortic stenosis, atrial fibrillation, coronary artery disease, recent myocardial infarction, or implantable device/pacemaker [No Pertinent Pulmonary History] : no history of asthma, COPD, sleep apnea, or smoking [No Adverse Anesthesia Reaction] : no adverse anesthesia reaction in self or family member [(Patient denies any chest pain, claudication, dyspnea on exertion, orthopnea, palpitations or syncope)] : Patient denies any chest pain, claudication, dyspnea on exertion, orthopnea, palpitations or syncope [Good (7-10 METs)] : Good (7-10 METs) [Chronic Anticoagulation] : no chronic anticoagulation [Chronic Kidney Disease] : no chronic kidney disease [Diabetes] : no diabetes [FreeTextEntry1] : parathyroidectomy [FreeTextEntry2] : 10/26 [FreeTextEntry3] : dr Menon [FreeTextEntry4] : Roslyn comes in for preoperative evaluation prior to parathyroidectomy.  She has 2 parathyroid adenomas for many years and because  her bone density shows continuos loss of bone and the decision was made to remove the parathyroids.\par she feels otherwise  well\par She was recently seen by her cardiologist Dr. Cole

## 2022-10-19 NOTE — ASSESSMENT
[Patient Optimized for Surgery] : Patient optimized for surgery [No Further Testing Recommended] : no further testing recommended [As per surgery] : as per surgery [FreeTextEntry4] : Ms. HAMPTON  is a 87 year-old female  with no significant history of coronary artery disease.  She  denies chest pain, palpitations or shortness of breath and her EKG in September was was normal.  She  doesn't take blood thinners and will continue her  medications perioperatively as instructed.\par \par Ms. HAMPTON  has a moderate risk for perioperative cardiovascular complications and will undergo the procedure as planned provided today's blood work is favorable.\par

## 2022-10-24 ENCOUNTER — TRANSCRIPTION ENCOUNTER (OUTPATIENT)
Age: 87
End: 2022-10-24

## 2022-10-24 ENCOUNTER — LABORATORY RESULT (OUTPATIENT)
Age: 87
End: 2022-10-24

## 2022-10-24 DIAGNOSIS — N39.0 URINARY TRACT INFECTION, SITE NOT SPECIFIED: ICD-10-CM

## 2022-10-26 LAB
APPEARANCE: CLEAR
BILIRUBIN URINE: NEGATIVE
BLOOD URINE: NEGATIVE
COLOR: YELLOW
GLUCOSE QUALITATIVE U: NEGATIVE
KETONES URINE: NEGATIVE
LEUKOCYTE ESTERASE URINE: ABNORMAL
NITRITE URINE: NEGATIVE
PH URINE: 6
PROTEIN URINE: ABNORMAL
SPECIFIC GRAVITY URINE: 1.02
UROBILINOGEN URINE: NORMAL

## 2022-11-21 ENCOUNTER — LABORATORY RESULT (OUTPATIENT)
Age: 87
End: 2022-11-21

## 2022-11-21 ENCOUNTER — APPOINTMENT (OUTPATIENT)
Dept: INTERNAL MEDICINE | Facility: CLINIC | Age: 87
End: 2022-11-21

## 2022-11-21 VITALS
OXYGEN SATURATION: 98 % | DIASTOLIC BLOOD PRESSURE: 86 MMHG | TEMPERATURE: 96.7 F | BODY MASS INDEX: 20.33 KG/M2 | HEIGHT: 65 IN | WEIGHT: 122 LBS | SYSTOLIC BLOOD PRESSURE: 138 MMHG | HEART RATE: 80 BPM

## 2022-11-21 DIAGNOSIS — H25.89 OTHER AGE-RELATED CATARACT: ICD-10-CM

## 2022-11-21 PROCEDURE — 99214 OFFICE O/P EST MOD 30 MIN: CPT | Mod: 25

## 2022-11-21 PROCEDURE — 36415 COLL VENOUS BLD VENIPUNCTURE: CPT

## 2022-11-21 PROCEDURE — G0439: CPT

## 2022-11-21 RX ORDER — OXYCODONE 5 MG/1
5 TABLET ORAL
Qty: 5 | Refills: 0 | Status: DISCONTINUED | COMMUNITY
Start: 2022-10-26

## 2022-11-21 RX ORDER — AMPICILLIN 500 MG/1
500 CAPSULE ORAL
Qty: 10 | Refills: 0 | Status: DISCONTINUED | COMMUNITY
Start: 2022-10-18 | End: 2022-11-21

## 2022-11-21 RX ORDER — COVID-19 MOLECULAR TEST ASSAY
KIT MISCELLANEOUS
Qty: 8 | Refills: 0 | Status: DISCONTINUED | COMMUNITY
Start: 2022-11-02

## 2022-11-21 NOTE — PHYSICAL EXAM
[Normal] : no CVA tenderness, no spinal tenderness [de-identified] : bilat no gross lumps [de-identified] : moles, dry

## 2022-11-21 NOTE — HEALTH RISK ASSESSMENT
[Very Good] : ~his/her~  mood as very good [Never] : Never [Yes] : Yes [0] : 2) Feeling down, depressed, or hopeless: Not at all (0) [PHQ-2 Negative - No further assessment needed] : PHQ-2 Negative - No further assessment needed [Patient reported mammogram was normal] : Patient reported mammogram was normal [Patient reported bone density results were abnormal] : Patient reported bone density results were abnormal [Patient reported colonoscopy was normal] : Patient reported colonoscopy was normal [HIV test declined] : HIV test declined [Hepatitis C test offered] : Hepatitis C test offered [Alone] : lives alone [Retired] : retired [College] : College [] :  [# Of Children ___] : has [unfilled] children [Fully functional (bathing, dressing, toileting, transferring, walking, feeding)] : Fully functional (bathing, dressing, toileting, transferring, walking, feeding) [Fully functional (using the telephone, shopping, preparing meals, housekeeping, doing laundry, using] : Fully functional and needs no help or supervision to perform IADLs (using the telephone, shopping, preparing meals, housekeeping, doing laundry, using transportation, managing medications and managing finances) [[id number]] : 0 [Reports changes in hearing] : Reports no changes in hearing [Reports changes in vision] : Reports no changes in vision [MammogramDate] : 01/22 [BoneDensityDate] : 01/22 [ColonoscopyDate] : 01/13

## 2022-11-22 LAB
ALBUMIN SERPL ELPH-MCNC: 4.7 G/DL
ALP BLD-CCNC: 61 U/L
ALT SERPL-CCNC: 16 U/L
ANION GAP SERPL CALC-SCNC: 10 MMOL/L
APPEARANCE: CLEAR
AST SERPL-CCNC: 23 U/L
BASOPHILS # BLD AUTO: 0.05 K/UL
BASOPHILS NFR BLD AUTO: 0.9 %
BILIRUB SERPL-MCNC: 0.5 MG/DL
BILIRUBIN URINE: NEGATIVE
BLOOD URINE: NEGATIVE
BUN SERPL-MCNC: 31 MG/DL
CALCIUM SERPL-MCNC: 9.8 MG/DL
CHLORIDE SERPL-SCNC: 103 MMOL/L
CHOLEST SERPL-MCNC: 265 MG/DL
CO2 SERPL-SCNC: 24 MMOL/L
COLOR: NORMAL
CREAT SERPL-MCNC: 1.19 MG/DL
EGFR: 45 ML/MIN/1.73M2
EOSINOPHIL # BLD AUTO: 0.03 K/UL
EOSINOPHIL NFR BLD AUTO: 0.5 %
ESTIMATED AVERAGE GLUCOSE: 126 MG/DL
FOLATE SERPL-MCNC: 10.8 NG/ML
GLUCOSE QUALITATIVE U: NEGATIVE
GLUCOSE SERPL-MCNC: 114 MG/DL
HBA1C MFR BLD HPLC: 6 %
HCT VFR BLD CALC: 39.4 %
HDLC SERPL-MCNC: 116 MG/DL
HGB BLD-MCNC: 11.9 G/DL
IMM GRANULOCYTES NFR BLD AUTO: 0.2 %
KETONES URINE: NEGATIVE
LDLC SERPL CALC-MCNC: 134 MG/DL
LEUKOCYTE ESTERASE URINE: ABNORMAL
LYMPHOCYTES # BLD AUTO: 1.24 K/UL
LYMPHOCYTES NFR BLD AUTO: 21.5 %
MAN DIFF?: NORMAL
MCHC RBC-ENTMCNC: 30.2 GM/DL
MCHC RBC-ENTMCNC: 30.6 PG
MCV RBC AUTO: 101.3 FL
MONOCYTES # BLD AUTO: 0.47 K/UL
MONOCYTES NFR BLD AUTO: 8.2 %
NEUTROPHILS # BLD AUTO: 3.96 K/UL
NEUTROPHILS NFR BLD AUTO: 68.7 %
NITRITE URINE: NEGATIVE
NONHDLC SERPL-MCNC: 149 MG/DL
PH URINE: 6
PLATELET # BLD AUTO: 124 K/UL
POTASSIUM SERPL-SCNC: 4.7 MMOL/L
PROT SERPL-MCNC: 7.5 G/DL
PROTEIN URINE: ABNORMAL
RBC # BLD: 3.89 M/UL
RBC # FLD: 13.2 %
SODIUM SERPL-SCNC: 136 MMOL/L
SPECIFIC GRAVITY URINE: 1.02
T4 FREE SERPL-MCNC: 1.4 NG/DL
TRIGL SERPL-MCNC: 79 MG/DL
TSH SERPL-ACNC: 2.04 UIU/ML
UROBILINOGEN URINE: NORMAL
VIT B12 SERPL-MCNC: 525 PG/ML
WBC # FLD AUTO: 5.76 K/UL

## 2022-12-08 ENCOUNTER — APPOINTMENT (OUTPATIENT)
Dept: NEPHROLOGY | Facility: CLINIC | Age: 87
End: 2022-12-08

## 2022-12-08 VITALS
HEART RATE: 87 BPM | BODY MASS INDEX: 20.47 KG/M2 | OXYGEN SATURATION: 99 % | WEIGHT: 123 LBS | TEMPERATURE: 96.8 F | DIASTOLIC BLOOD PRESSURE: 90 MMHG | SYSTOLIC BLOOD PRESSURE: 156 MMHG

## 2022-12-08 PROCEDURE — 99214 OFFICE O/P EST MOD 30 MIN: CPT

## 2022-12-08 RX ORDER — SODIUM POLYSTYRENE SULFONATE 4.1 MEQ/G
POWDER, FOR SUSPENSION ORAL; RECTAL
Qty: 45 | Refills: 0 | Status: COMPLETED | COMMUNITY
Start: 2022-06-15

## 2022-12-12 NOTE — HISTORY OF PRESENT ILLNESS
[FreeTextEntry1] : 88 yo woman with PMHx of CKD stage 3, HTN, Intermittent hyperkalemia, HPTH (s/p parathyroidectomy 10/26/22), Osteoporosis, Stenosis of right UPJ, Myalgia, Thyroid nodule presents here today for follow up with chronic kidney disease and hypertension.\par \par \par s/p parathyroidectomy by Dr Etta Menon at [facility] on 10/26/22\par Doppler carotid on 9/21/22, following by Cardio\par Prolia inj on 9/23/22, following by Endo \par \par white coat hypertension, bp 156/70 in the office, asymptomatic \par patient states she has a lot of appointments today and stressed \par home log bp reviewed and bp appears to be well controlled\par am bp mainly in 120/60-70 and pm bp mainly in high 120/60-70's  \par denies any active complaints at present\par \par no changes in medications \par

## 2022-12-12 NOTE — PHYSICAL EXAM
[General Appearance - Alert] : alert [General Appearance - In No Acute Distress] : in no acute distress [Extraocular Movements] : extraocular movements were intact [Jugular Venous Distention Increased] : there was no jugular-venous distention [] : no respiratory distress [Respiration, Rhythm And Depth] : normal respiratory rhythm and effort [Exaggerated Use Of Accessory Muscles For Inspiration] : no accessory muscle use [Auscultation Breath Sounds / Voice Sounds] : lungs were clear to auscultation bilaterally [Heart Rate And Rhythm] : heart rate was normal and rhythm regular [Heart Sounds] : normal S1 and S2 [No CVA Tenderness] : no ~M costovertebral angle tenderness [Abnormal Walk] : normal gait [No Focal Deficits] : no focal deficits [Oriented To Time, Place, And Person] : oriented to person, place, and time [FreeTextEntry1] : warm and dry

## 2022-12-12 NOTE — ASSESSMENT
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/min) with non-nephrotic proteinuria, baseline creatinine 1.2-1.3, 1.5 (7/13/22) -> 1.4 (8/9/22) ->1.19, eGFR 45 (11/21/22); upcr 0.79; no casts; low urine sodium \par - ckd likely due to long-standing hypertension \par - maintain adequate hydration \par - maintain bp control \par - avoid nephrotoxins/NSAIDs\par \par #Right UPJ stenosis - nuclear scan in 12/2021 noted for left kidney 69%, right kidney 31%\par - followed by Urology, no intervention \par \par #Hyperkalemia - intermittent, K 4.7 (11/21/22)\par - continue on strict low potassium diet\par - avoid ACE/ARB/ potassium-sparing diuretic \par - SPS as needed\par \par #Proteinuria, non-nephrotic with pcr 0.79 (8/9/22)\par - negative MM workup w/ negative SPEP, IFx, UPEP\par - avoid ACE/ ARBs due to intermittent hyperkalemia\par \par #Hypertension with white coat component - home bp log reviewed \par - amlodipine 5 mg qpm \par - continue low salt diet\par - continue home bp monitoring\par \par #HPTH/ Osteoporosis - s/p parathyroidectomy by Dr Etta Menon at [facility] on 10/26/22\par - following by Endo\par \par #Lower extr edema - chronic venous insufficiency and amlodipine -related venodilation effect\par - would benefit compression stockings, legs elevation \par \par follow up in 3 months

## 2023-03-02 ENCOUNTER — APPOINTMENT (OUTPATIENT)
Dept: INTERNAL MEDICINE | Facility: CLINIC | Age: 88
End: 2023-03-02
Payer: MEDICARE

## 2023-03-02 VITALS
SYSTOLIC BLOOD PRESSURE: 124 MMHG | HEIGHT: 65 IN | BODY MASS INDEX: 19.99 KG/M2 | OXYGEN SATURATION: 99 % | TEMPERATURE: 98.1 F | DIASTOLIC BLOOD PRESSURE: 80 MMHG | HEART RATE: 87 BPM | WEIGHT: 120 LBS

## 2023-03-02 DIAGNOSIS — M35.1 OTHER OVERLAP SYNDROMES: ICD-10-CM

## 2023-03-02 DIAGNOSIS — U07.1 COVID-19: ICD-10-CM

## 2023-03-02 PROCEDURE — 99214 OFFICE O/P EST MOD 30 MIN: CPT | Mod: 25

## 2023-03-02 PROCEDURE — 36415 COLL VENOUS BLD VENIPUNCTURE: CPT

## 2023-03-02 NOTE — HEALTH RISK ASSESSMENT
[No] : No [0] : 2) Feeling down, depressed, or hopeless: Not at all (0) [PHQ-2 Negative - No further assessment needed] : PHQ-2 Negative - No further assessment needed [Never] : Never [[id number]] : 0

## 2023-03-02 NOTE — HISTORY OF PRESENT ILLNESS
[de-identified] : Roslyn comes in for medical follow-up.  She saw her endocrinologist and labs were reviewed c px (iPTH)\par she is fasting today and is here to follow-up with her diabetes.  Feels fine, recovered from COVID (Jan)slowly and not sure her energy is back on the process.

## 2023-03-02 NOTE — PHYSICAL EXAM
[No Edema] : there was no peripheral edema [No Joint Swelling] : no joint swelling [Grossly Normal Strength/Tone] : grossly normal strength/tone [Normal] : normal gait, coordination grossly intact, no focal deficits and deep tendon reflexes were 2+ and symmetric

## 2023-03-02 NOTE — REVIEW OF SYSTEMS
[Muscle Weakness] : muscle weakness [Negative] : Neurological [Joint Pain] : no joint pain [Muscle Pain] : no muscle pain [FreeTextEntry9] : see HPI

## 2023-03-03 LAB
ALBUMIN SERPL ELPH-MCNC: 4.9 G/DL
ALP BLD-CCNC: 68 U/L
ALT SERPL-CCNC: 18 U/L
ANION GAP SERPL CALC-SCNC: 17 MMOL/L
AST SERPL-CCNC: 25 U/L
BILIRUB SERPL-MCNC: 0.4 MG/DL
BUN SERPL-MCNC: 43 MG/DL
CALCIUM SERPL-MCNC: 10 MG/DL
CHLORIDE SERPL-SCNC: 101 MMOL/L
CHOLEST SERPL-MCNC: 253 MG/DL
CO2 SERPL-SCNC: 21 MMOL/L
CREAT SERPL-MCNC: 1.45 MG/DL
EGFR: 35 ML/MIN/1.73M2
ESTIMATED AVERAGE GLUCOSE: 120 MG/DL
GLUCOSE SERPL-MCNC: 127 MG/DL
HBA1C MFR BLD HPLC: 5.8 %
HDLC SERPL-MCNC: 111 MG/DL
LDLC SERPL CALC-MCNC: 128 MG/DL
NONHDLC SERPL-MCNC: 141 MG/DL
POTASSIUM SERPL-SCNC: 5.3 MMOL/L
PROT SERPL-MCNC: 7.7 G/DL
SODIUM SERPL-SCNC: 139 MMOL/L
TRIGL SERPL-MCNC: 66 MG/DL

## 2023-03-14 ENCOUNTER — NON-APPOINTMENT (OUTPATIENT)
Age: 88
End: 2023-03-14

## 2023-03-15 ENCOUNTER — APPOINTMENT (OUTPATIENT)
Dept: INTERNAL MEDICINE | Facility: CLINIC | Age: 88
End: 2023-03-15
Payer: MEDICARE

## 2023-03-15 PROCEDURE — 36415 COLL VENOUS BLD VENIPUNCTURE: CPT

## 2023-03-16 ENCOUNTER — TRANSCRIPTION ENCOUNTER (OUTPATIENT)
Age: 88
End: 2023-03-16

## 2023-03-16 LAB
ALBUMIN SERPL ELPH-MCNC: 4.5 G/DL
ALP BLD-CCNC: 75 U/L
ALT SERPL-CCNC: 23 U/L
ANION GAP SERPL CALC-SCNC: 13 MMOL/L
AST SERPL-CCNC: 26 U/L
BILIRUB SERPL-MCNC: 0.3 MG/DL
BUN SERPL-MCNC: 44 MG/DL
CALCIUM SERPL-MCNC: 9.6 MG/DL
CHLORIDE SERPL-SCNC: 101 MMOL/L
CO2 SERPL-SCNC: 23 MMOL/L
CREAT SERPL-MCNC: 1.34 MG/DL
EGFR: 38 ML/MIN/1.73M2
GLUCOSE SERPL-MCNC: 136 MG/DL
POTASSIUM SERPL-SCNC: 5.1 MMOL/L
PROT SERPL-MCNC: 7.1 G/DL
SODIUM SERPL-SCNC: 137 MMOL/L

## 2023-03-29 ENCOUNTER — APPOINTMENT (OUTPATIENT)
Dept: NEPHROLOGY | Facility: CLINIC | Age: 88
End: 2023-03-29
Payer: MEDICARE

## 2023-03-29 ENCOUNTER — RESULT REVIEW (OUTPATIENT)
Age: 88
End: 2023-03-29

## 2023-03-29 VITALS
DIASTOLIC BLOOD PRESSURE: 80 MMHG | OXYGEN SATURATION: 99 % | BODY MASS INDEX: 19.91 KG/M2 | SYSTOLIC BLOOD PRESSURE: 150 MMHG | WEIGHT: 119.5 LBS | HEIGHT: 65 IN | TEMPERATURE: 96.4 F | HEART RATE: 78 BPM

## 2023-03-29 PROCEDURE — 99214 OFFICE O/P EST MOD 30 MIN: CPT

## 2023-03-29 NOTE — HISTORY OF PRESENT ILLNESS
[FreeTextEntry1] : 87 yo woman with PMHx of CKD stage 3, HTN, Intermittent hyperkalemia, HPTH (s/p parathyroidectomy 10/26/22), Osteoporosis, Stenosis of right UPJ, Myalgia, Thyroid nodule presents here today for follow up with chronic kidney disease, hypertension, hyperkalemia, hypercalcemia, hPTH.\par \par s/p parathyroidectomy by Dr Etta Menon at [facility] on 10/26/22\par Doppler carotid on 9/21/22, following by Cardio\par Prolia inj on 9/23/22, following by Endo \par \par BP stable\par \par no changes in medications \par

## 2023-03-29 NOTE — ASSESSMENT
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/min) with non-nephrotic proteinuria, baseline creatinine 1.2-1.3, 1.5 (7/13/22) -> 1.4 (8/9/22) ->1.19, eGFR 45 (11/21/22); upcr 0.79; no casts; low urine sodium \par - ckd likely due to long-standing hypertension \par - maintain adequate hydration \par - maintain bp control \par - avoid nephrotoxins/NSAIDs\par \par #Right UPJ stenosis \par - nuclear scan in 12/2021 noted for left kidney 69%, right kidney 31%\par - followed by Urology, no intervention \par \par #Hyperkalemia\par - intermittent, K 4.7 (11/21/22), 5.1 3/2021\par - increase fluid intake, continue low potassium diet\par - avoid ACE/ARB/ potassium-sparing diuretic \par - consider addition of low dose HCTZ \par - SPS as needed\par \par #Proteinuria, non-nephrotic with pcr 0.79 (8/9/22)\par - negative MM workup w/ negative SPEP, IFx, UPEP\par - avoid ACE/ ARBs due to intermittent hyperkalemia\par \par Nocturia\par - refer to Urogyn\par #Hypertension with white coat component - home bp log reviewed \par - amlodipine 5 mg qpm \par - continue low salt diet\par - continue home bp monitoring\par \par #HPTH/ Osteoporosis - s/p parathyroidectomy by Dr Etta Menon at [facility] on 10/26/22\par - following by Endo\par \par #Lower extr edema - chronic venous insufficiency and amlodipine -related venodilation effect\par - would benefit compression stockings, legs elevation \par \par follow up in 3 months

## 2023-04-10 ENCOUNTER — TRANSCRIPTION ENCOUNTER (OUTPATIENT)
Age: 88
End: 2023-04-10

## 2023-06-07 ENCOUNTER — APPOINTMENT (OUTPATIENT)
Dept: INTERNAL MEDICINE | Facility: CLINIC | Age: 88
End: 2023-06-07
Payer: MEDICARE

## 2023-06-07 VITALS
SYSTOLIC BLOOD PRESSURE: 162 MMHG | OXYGEN SATURATION: 98 % | WEIGHT: 120.38 LBS | DIASTOLIC BLOOD PRESSURE: 80 MMHG | HEIGHT: 65 IN | HEART RATE: 98 BPM | BODY MASS INDEX: 20.06 KG/M2

## 2023-06-07 DIAGNOSIS — H91.90 UNSPECIFIED HEARING LOSS, UNSPECIFIED EAR: ICD-10-CM

## 2023-06-07 DIAGNOSIS — R73.03 PREDIABETES.: ICD-10-CM

## 2023-06-07 PROCEDURE — 99214 OFFICE O/P EST MOD 30 MIN: CPT

## 2023-06-07 RX ORDER — AMLODIPINE BESYLATE 5 MG/1
5 TABLET ORAL DAILY
Qty: 90 | Refills: 3 | Status: ACTIVE | COMMUNITY
Start: 2022-11-29

## 2023-06-07 NOTE — REVIEW OF SYSTEMS
[Incontinence] : no incontinence [Frequency] : frequency [Joint Pain] : no joint pain [Muscle Weakness] : muscle weakness [Muscle Pain] : no muscle pain [Dizziness] : no dizziness [Unsteady Walking] : no ataxia [Negative] : Respiratory [FreeTextEntry2] : foggy brain [de-identified] : feet paresthesias

## 2023-06-07 NOTE — HISTORY OF PRESENT ILLNESS
[de-identified] : Roslyn comes in today to discuss several issues 1. after COVID in January she gave herself a month recover and by February she was better she is able to exercise fully and feels much better after that but somehow she is still has the foggy brain when she does not exercise.  All the other routine is back to normal as she works with a  2 times a week. \par  2 she has problem feeling the ground when she has shoes on\par  3. she would like a hearing test \par 4. she is concerned about her potassium.

## 2023-06-07 NOTE — HEALTH RISK ASSESSMENT
[0] : 2) Feeling down, depressed, or hopeless: Not at all (0) [PHQ-2 Negative - No further assessment needed] : PHQ-2 Negative - No further assessment needed [UXD3Xqcha] : 0 [Never] : Never

## 2023-06-08 ENCOUNTER — RESULT REVIEW (OUTPATIENT)
Age: 88
End: 2023-06-08

## 2023-06-09 ENCOUNTER — TRANSCRIPTION ENCOUNTER (OUTPATIENT)
Age: 88
End: 2023-06-09

## 2023-06-29 ENCOUNTER — APPOINTMENT (OUTPATIENT)
Dept: NEPHROLOGY | Facility: CLINIC | Age: 88
End: 2023-06-29
Payer: MEDICARE

## 2023-06-29 ENCOUNTER — RESULT REVIEW (OUTPATIENT)
Age: 88
End: 2023-06-29

## 2023-06-29 VITALS
TEMPERATURE: 98.4 F | OXYGEN SATURATION: 99 % | BODY MASS INDEX: 20.49 KG/M2 | SYSTOLIC BLOOD PRESSURE: 110 MMHG | DIASTOLIC BLOOD PRESSURE: 60 MMHG | WEIGHT: 123 LBS | HEIGHT: 65 IN | HEART RATE: 74 BPM

## 2023-06-29 PROCEDURE — 36415 COLL VENOUS BLD VENIPUNCTURE: CPT

## 2023-06-30 ENCOUNTER — TRANSCRIPTION ENCOUNTER (OUTPATIENT)
Age: 88
End: 2023-06-30

## 2023-06-30 ENCOUNTER — RESULT REVIEW (OUTPATIENT)
Age: 88
End: 2023-06-30

## 2023-06-30 ENCOUNTER — APPOINTMENT (OUTPATIENT)
Dept: NEPHROLOGY | Facility: CLINIC | Age: 88
End: 2023-06-30
Payer: MEDICARE

## 2023-06-30 PROCEDURE — 36415 COLL VENOUS BLD VENIPUNCTURE: CPT

## 2023-07-03 ENCOUNTER — TRANSCRIPTION ENCOUNTER (OUTPATIENT)
Age: 88
End: 2023-07-03

## 2023-07-06 NOTE — ASSESSMENT
[FreeTextEntry1] : 89 yo woman with PMHx of CKD stage 3, HTN, Intermittent hyperkalemia, HPTH (s/p parathyroidectomy 10/26/22), Osteoporosis, Stenosis of right UPJ, Myalgia, Thyroid nodule presents here today for follow up with chronic kidney disease and hypertension.\par \par \par 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/min) with non-nephrotic proteinuria, baseline creatinine 1.2-1.3, 1.5 (7/13/22) -> 1.4 (8/9/22) ->1.19, eGFR 45 (11/21/22); upcr 0.79; no casts; low urine sodium \par - ckd likely due to long-standing hypertension \par - maintain adequate hydration \par - maintain bp control \par - avoid nephrotoxins/NSAIDs\par \par #Right UPJ stenosis \par - nuclear scan in 12/2021 noted for left kidney 69%, right kidney 31%\par - followed by Urology, no intervention \par \par #Hyperkalemia\par - intermittent, K was 5.8 today on labs drawn at time of visit, patient was called and instructed to  increase fluid intake, continue low potassium diet and take the SPS/kayexalate she has and return in am for labs\par - avoid ACE/ARB/ potassium-sparing diuretic \par - consider addition of low dose HCTZ \par - SPS as needed\par \par #Proteinuria, non-nephrotic with pcr 0.79 (8/9/22)\par - negative MM workup w/ negative SPEP, IFx, UPEP\par - avoid ACE/ ARBs due to intermittent hyperkalemia\par \par Nocturia\par - refer to Urogyn\par #Hypertension with white coat component - home bp log reviewed \par - amlodipine 5 mg qpm \par - continue low salt diet\par - continue home bp monitoring\par \par #HPTH/ Osteoporosis - s/p parathyroidectomy by Dr Etta Menon at [facility] on 10/26/22\par - following by Endo\par \par #Lower extr edema - chronic venous insufficiency and amlodipine -related venodilation effect\par - would benefit compression stockings, legs elevation \par \par follow up in 3 months

## 2023-07-06 NOTE — HISTORY OF PRESENT ILLNESS
[FreeTextEntry1] : 89 yo woman with PMHx of CKD stage 3, HTN, Intermittent hyperkalemia, HPTH (s/p parathyroidectomy 10/26/22), Osteoporosis, Stenosis of right UPJ, Myalgia, Thyroid nodule presents here today for follow up with chronic kidney disease, hypertension, hyperkalemia, hypercalcemia, hPTH, hyperkalemia\par \par s/p parathyroidectomy by Dr Etta Menon at [facility] on 10/26/22\par Doppler carotid on 9/21/22, following by Cardio\par Prolia inj on 9/23/22, following by Endo \par \par BP stable\par \par no changes in medications \par \par 6/2023\par - as above,but today c/o feeling tired\par

## 2023-07-06 NOTE — PHYSICAL EXAM
[General Appearance - Alert] : alert [General Appearance - In No Acute Distress] : in no acute distress [Extraocular Movements] : extraocular movements were intact [Jugular Venous Distention Increased] : there was no jugular-venous distention [] : no respiratory distress [Respiration, Rhythm And Depth] : normal respiratory rhythm and effort [Exaggerated Use Of Accessory Muscles For Inspiration] : no accessory muscle use [Auscultation Breath Sounds / Voice Sounds] : lungs were clear to auscultation bilaterally [Heart Rate And Rhythm] : heart rate was normal and rhythm regular [Heart Sounds] : normal S1 and S2 [No CVA Tenderness] : no ~M costovertebral angle tenderness [Abnormal Walk] : normal gait [No Focal Deficits] : no focal deficits [Oriented To Time, Place, And Person] : oriented to person, place, and time [Skin Color & Pigmentation] : normal skin color and pigmentation [Skin Turgor] : normal skin turgor [FreeTextEntry1] : warm and dry

## 2023-07-15 ENCOUNTER — RESULT REVIEW (OUTPATIENT)
Age: 88
End: 2023-07-15

## 2023-07-21 ENCOUNTER — TRANSCRIPTION ENCOUNTER (OUTPATIENT)
Age: 88
End: 2023-07-21

## 2023-08-02 ENCOUNTER — APPOINTMENT (OUTPATIENT)
Dept: INTERNAL MEDICINE | Facility: CLINIC | Age: 88
End: 2023-08-02
Payer: MEDICARE

## 2023-08-02 VITALS
SYSTOLIC BLOOD PRESSURE: 130 MMHG | BODY MASS INDEX: 19.99 KG/M2 | HEART RATE: 69 BPM | OXYGEN SATURATION: 100 % | TEMPERATURE: 96.8 F | HEIGHT: 65 IN | DIASTOLIC BLOOD PRESSURE: 82 MMHG | WEIGHT: 120 LBS

## 2023-08-02 DIAGNOSIS — M47.816 SPONDYLOSIS W/OUT MYELOPATHY OR RADICULOPATHY, LUMBAR REGION: ICD-10-CM

## 2023-08-02 PROCEDURE — 99214 OFFICE O/P EST MOD 30 MIN: CPT | Mod: 25

## 2023-08-02 PROCEDURE — 36415 COLL VENOUS BLD VENIPUNCTURE: CPT

## 2023-08-02 NOTE — REVIEW OF SYSTEMS
[Frequency] : frequency [Muscle Weakness] : muscle weakness [Negative] : Respiratory [Incontinence] : no incontinence [Dizziness] : no dizziness [Unsteady Walking] : no ataxia [FreeTextEntry2] : foggy brain [de-identified] : feet paresthesias

## 2023-08-02 NOTE — HISTORY OF PRESENT ILLNESS
[de-identified] : here for medical f/u , feels feet are not responding to commands, no falls , cannot stride ,  , had lumbar MRI in July, needs mammo  script

## 2023-08-02 NOTE — HEALTH RISK ASSESSMENT
[Yes] : Yes [No falls in past year] : Patient reported no falls in the past year [0] : 2) Feeling down, depressed, or hopeless: Not at all (0) [PHQ-2 Negative - No further assessment needed] : PHQ-2 Negative - No further assessment needed [Never] : Never [[id number]] : 0

## 2023-08-02 NOTE — PHYSICAL EXAM
[Normal] : normal gait, coordination grossly intact, no focal deficits and deep tendon reflexes were 2+ and symmetric [Grossly Normal Strength/Tone] : grossly normal strength/tone [de-identified] : no foot drop

## 2023-08-03 LAB
ANION GAP SERPL CALC-SCNC: 15 MMOL/L
BUN SERPL-MCNC: 44 MG/DL
CALCIUM SERPL-MCNC: 10.3 MG/DL
CHLORIDE SERPL-SCNC: 103 MMOL/L
CO2 SERPL-SCNC: 22 MMOL/L
CREAT SERPL-MCNC: 1.35 MG/DL
EGFR: 38 ML/MIN/1.73M2
GLUCOSE SERPL-MCNC: 114 MG/DL
POTASSIUM SERPL-SCNC: 5.6 MMOL/L
POTASSIUM SERPL-SCNC: 5.7 MMOL/L
SODIUM SERPL-SCNC: 139 MMOL/L

## 2023-08-08 ENCOUNTER — TRANSCRIPTION ENCOUNTER (OUTPATIENT)
Age: 88
End: 2023-08-08

## 2023-08-09 ENCOUNTER — RESULT REVIEW (OUTPATIENT)
Age: 88
End: 2023-08-09

## 2023-08-09 ENCOUNTER — APPOINTMENT (OUTPATIENT)
Dept: NEPHROLOGY | Facility: CLINIC | Age: 88
End: 2023-08-09
Payer: MEDICARE

## 2023-08-09 PROCEDURE — 36415 COLL VENOUS BLD VENIPUNCTURE: CPT

## 2023-08-11 ENCOUNTER — RESULT REVIEW (OUTPATIENT)
Age: 88
End: 2023-08-11

## 2023-08-11 ENCOUNTER — APPOINTMENT (OUTPATIENT)
Dept: NEPHROLOGY | Facility: CLINIC | Age: 88
End: 2023-08-11
Payer: MEDICARE

## 2023-08-11 PROCEDURE — 36415 COLL VENOUS BLD VENIPUNCTURE: CPT

## 2023-08-15 ENCOUNTER — TRANSCRIPTION ENCOUNTER (OUTPATIENT)
Age: 88
End: 2023-08-15

## 2023-08-17 ENCOUNTER — TRANSCRIPTION ENCOUNTER (OUTPATIENT)
Age: 88
End: 2023-08-17

## 2023-10-03 ENCOUNTER — APPOINTMENT (OUTPATIENT)
Dept: NEUROLOGY | Facility: CLINIC | Age: 88
End: 2023-10-03
Payer: MEDICARE

## 2023-10-03 VITALS
HEART RATE: 76 BPM | BODY MASS INDEX: 19.66 KG/M2 | HEIGHT: 65 IN | OXYGEN SATURATION: 97 % | SYSTOLIC BLOOD PRESSURE: 179 MMHG | WEIGHT: 118 LBS | DIASTOLIC BLOOD PRESSURE: 89 MMHG

## 2023-10-03 PROCEDURE — 99215 OFFICE O/P EST HI 40 MIN: CPT

## 2023-10-18 ENCOUNTER — LABORATORY RESULT (OUTPATIENT)
Age: 88
End: 2023-10-18

## 2023-10-18 ENCOUNTER — APPOINTMENT (OUTPATIENT)
Dept: INTERNAL MEDICINE | Facility: CLINIC | Age: 88
End: 2023-10-18
Payer: MEDICARE

## 2023-10-18 PROCEDURE — P9615: CPT

## 2023-10-19 LAB
APPEARANCE: ABNORMAL
BILIRUBIN URINE: NEGATIVE
BLOOD URINE: ABNORMAL
COLOR: YELLOW
GLUCOSE QUALITATIVE U: NEGATIVE MG/DL
KETONES URINE: NEGATIVE MG/DL
LEUKOCYTE ESTERASE URINE: ABNORMAL
NITRITE URINE: NEGATIVE
PH URINE: 6
PROTEIN URINE: 300 MG/DL
SPECIFIC GRAVITY URINE: 1.01
UROBILINOGEN URINE: 0.2 MG/DL

## 2023-10-23 ENCOUNTER — TRANSCRIPTION ENCOUNTER (OUTPATIENT)
Age: 88
End: 2023-10-23

## 2023-10-24 ENCOUNTER — APPOINTMENT (OUTPATIENT)
Dept: INTERNAL MEDICINE | Facility: CLINIC | Age: 88
End: 2023-10-24
Payer: MEDICARE

## 2023-10-24 ENCOUNTER — LABORATORY RESULT (OUTPATIENT)
Age: 88
End: 2023-10-24

## 2023-10-24 PROCEDURE — P9615: CPT

## 2023-10-25 LAB
APPEARANCE: CLEAR
BILIRUBIN URINE: NEGATIVE
BLOOD URINE: NEGATIVE
COLOR: YELLOW
GLUCOSE QUALITATIVE U: NEGATIVE MG/DL
KETONES URINE: NEGATIVE MG/DL
LEUKOCYTE ESTERASE URINE: ABNORMAL
NITRITE URINE: NEGATIVE
PH URINE: 5.5
PROTEIN URINE: 100 MG/DL
SPECIFIC GRAVITY URINE: 1.01
UROBILINOGEN URINE: 0.2 MG/DL

## 2023-10-26 ENCOUNTER — RESULT REVIEW (OUTPATIENT)
Age: 88
End: 2023-10-26

## 2023-10-26 ENCOUNTER — APPOINTMENT (OUTPATIENT)
Dept: NEPHROLOGY | Facility: CLINIC | Age: 88
End: 2023-10-26
Payer: MEDICARE

## 2023-10-26 VITALS
HEIGHT: 65 IN | BODY MASS INDEX: 19.49 KG/M2 | OXYGEN SATURATION: 99 % | WEIGHT: 117 LBS | SYSTOLIC BLOOD PRESSURE: 160 MMHG | HEART RATE: 71 BPM | DIASTOLIC BLOOD PRESSURE: 90 MMHG

## 2023-10-26 PROCEDURE — 99214 OFFICE O/P EST MOD 30 MIN: CPT

## 2023-10-27 ENCOUNTER — TRANSCRIPTION ENCOUNTER (OUTPATIENT)
Age: 88
End: 2023-10-27

## 2023-10-30 ENCOUNTER — TRANSCRIPTION ENCOUNTER (OUTPATIENT)
Age: 88
End: 2023-10-30

## 2023-10-30 ENCOUNTER — RESULT REVIEW (OUTPATIENT)
Age: 88
End: 2023-10-30

## 2023-10-31 ENCOUNTER — TRANSCRIPTION ENCOUNTER (OUTPATIENT)
Age: 88
End: 2023-10-31

## 2023-11-02 ENCOUNTER — TRANSCRIPTION ENCOUNTER (OUTPATIENT)
Age: 88
End: 2023-11-02

## 2023-11-13 ENCOUNTER — TRANSCRIPTION ENCOUNTER (OUTPATIENT)
Age: 88
End: 2023-11-13

## 2023-11-14 ENCOUNTER — TRANSCRIPTION ENCOUNTER (OUTPATIENT)
Age: 88
End: 2023-11-14

## 2023-12-01 ENCOUNTER — APPOINTMENT (OUTPATIENT)
Dept: INTERNAL MEDICINE | Facility: CLINIC | Age: 88
End: 2023-12-01
Payer: MEDICARE

## 2023-12-01 ENCOUNTER — LABORATORY RESULT (OUTPATIENT)
Age: 88
End: 2023-12-01

## 2023-12-01 VITALS — DIASTOLIC BLOOD PRESSURE: 80 MMHG | SYSTOLIC BLOOD PRESSURE: 132 MMHG

## 2023-12-01 VITALS
SYSTOLIC BLOOD PRESSURE: 140 MMHG | BODY MASS INDEX: 19.14 KG/M2 | TEMPERATURE: 97.6 F | DIASTOLIC BLOOD PRESSURE: 86 MMHG | OXYGEN SATURATION: 99 % | HEART RATE: 79 BPM | WEIGHT: 115 LBS

## 2023-12-01 DIAGNOSIS — Z87.898 PERSONAL HISTORY OF OTHER SPECIFIED CONDITIONS: ICD-10-CM

## 2023-12-01 DIAGNOSIS — R60.0 LOCALIZED EDEMA: ICD-10-CM

## 2023-12-01 DIAGNOSIS — Z87.39 PERSONAL HISTORY OF OTHER DISEASES OF THE MUSCULOSKELETAL SYSTEM AND CONNECTIVE TISSUE: ICD-10-CM

## 2023-12-01 DIAGNOSIS — Z01.818 ENCOUNTER FOR OTHER PREPROCEDURAL EXAMINATION: ICD-10-CM

## 2023-12-01 DIAGNOSIS — N81.4 UTEROVAGINAL PROLAPSE, UNSPECIFIED: ICD-10-CM

## 2023-12-01 PROCEDURE — G0439: CPT

## 2023-12-01 PROCEDURE — 36415 COLL VENOUS BLD VENIPUNCTURE: CPT

## 2023-12-01 PROCEDURE — 99213 OFFICE O/P EST LOW 20 MIN: CPT | Mod: 25

## 2023-12-01 RX ORDER — CEPHALEXIN 500 MG/1
500 CAPSULE ORAL TWICE DAILY
Qty: 6 | Refills: 0 | Status: DISCONTINUED | COMMUNITY
Start: 2023-10-19 | End: 2023-12-01

## 2023-12-01 RX ORDER — SODIUM POLYSTYRENE SULFONATE 4.1 MEQ/G
POWDER, FOR SUSPENSION ORAL; RECTAL
Qty: 1 | Refills: 1 | Status: DISCONTINUED | COMMUNITY
Start: 2023-10-26 | End: 2023-12-01

## 2023-12-04 LAB
25(OH)D3 SERPL-MCNC: 41.4 NG/ML
ALBUMIN SERPL ELPH-MCNC: 4.6 G/DL
ALP BLD-CCNC: 65 U/L
ALT SERPL-CCNC: 16 U/L
ANION GAP SERPL CALC-SCNC: 15 MMOL/L
APPEARANCE: ABNORMAL
AST SERPL-CCNC: 23 U/L
BASOPHILS # BLD AUTO: 0.05 K/UL
BASOPHILS NFR BLD AUTO: 0.8 %
BILIRUB SERPL-MCNC: 0.3 MG/DL
BILIRUBIN URINE: NEGATIVE
BLOOD URINE: ABNORMAL
BUN SERPL-MCNC: 40 MG/DL
CALCIUM SERPL-MCNC: 9.5 MG/DL
CHLORIDE SERPL-SCNC: 101 MMOL/L
CHOLEST SERPL-MCNC: 238 MG/DL
CO2 SERPL-SCNC: 20 MMOL/L
COLOR: YELLOW
CREAT SERPL-MCNC: 1.66 MG/DL
EGFR: 30 ML/MIN/1.73M2
EOSINOPHIL # BLD AUTO: 0.06 K/UL
EOSINOPHIL NFR BLD AUTO: 1 %
ESTIMATED AVERAGE GLUCOSE: 120 MG/DL
GLUCOSE QUALITATIVE U: NEGATIVE MG/DL
GLUCOSE SERPL-MCNC: 114 MG/DL
HBA1C MFR BLD HPLC: 5.8 %
HCT VFR BLD CALC: 38.2 %
HDLC SERPL-MCNC: 104 MG/DL
HGB BLD-MCNC: 11.4 G/DL
IMM GRANULOCYTES NFR BLD AUTO: 0.3 %
KETONES URINE: NEGATIVE MG/DL
LDLC SERPL CALC-MCNC: 119 MG/DL
LEUKOCYTE ESTERASE URINE: ABNORMAL
LYMPHOCYTES # BLD AUTO: 1.08 K/UL
LYMPHOCYTES NFR BLD AUTO: 18 %
MAN DIFF?: NORMAL
MCHC RBC-ENTMCNC: 29.8 GM/DL
MCHC RBC-ENTMCNC: 30.3 PG
MCV RBC AUTO: 101.6 FL
MONOCYTES # BLD AUTO: 0.85 K/UL
MONOCYTES NFR BLD AUTO: 14.2 %
NEUTROPHILS # BLD AUTO: 3.94 K/UL
NEUTROPHILS NFR BLD AUTO: 65.7 %
NITRITE URINE: POSITIVE
NONHDLC SERPL-MCNC: 134 MG/DL
PH URINE: 5.5
PLATELET # BLD AUTO: 134 K/UL
POTASSIUM SERPL-SCNC: 4.6 MMOL/L
PROT SERPL-MCNC: 8 G/DL
PROTEIN URINE: 100 MG/DL
RBC # BLD: 3.76 M/UL
RBC # FLD: 13.3 %
SODIUM SERPL-SCNC: 137 MMOL/L
SPECIFIC GRAVITY URINE: 1.01
T4 FREE SERPL-MCNC: 1.4 NG/DL
TRIGL SERPL-MCNC: 91 MG/DL
TSH SERPL-ACNC: 2.87 UIU/ML
UROBILINOGEN URINE: 0.2 MG/DL
WBC # FLD AUTO: 6 K/UL

## 2023-12-05 DIAGNOSIS — N39.0 URINARY TRACT INFECTION, SITE NOT SPECIFIED: ICD-10-CM

## 2023-12-05 LAB — BACTERIA UR CULT: ABNORMAL

## 2023-12-06 ENCOUNTER — APPOINTMENT (OUTPATIENT)
Dept: OBGYN | Facility: CLINIC | Age: 88
End: 2023-12-06

## 2023-12-12 ENCOUNTER — TRANSCRIPTION ENCOUNTER (OUTPATIENT)
Age: 88
End: 2023-12-12

## 2024-01-17 ENCOUNTER — APPOINTMENT (OUTPATIENT)
Dept: MAMMOGRAPHY | Facility: CLINIC | Age: 89
End: 2024-01-17
Payer: MEDICARE

## 2024-01-17 ENCOUNTER — RESULT REVIEW (OUTPATIENT)
Age: 89
End: 2024-01-17

## 2024-01-17 ENCOUNTER — APPOINTMENT (OUTPATIENT)
Dept: ULTRASOUND IMAGING | Facility: CLINIC | Age: 89
End: 2024-01-17
Payer: MEDICARE

## 2024-01-17 PROCEDURE — 76641 ULTRASOUND BREAST COMPLETE: CPT | Mod: 50,GY

## 2024-01-17 PROCEDURE — 77063 BREAST TOMOSYNTHESIS BI: CPT

## 2024-01-17 PROCEDURE — 77067 SCR MAMMO BI INCL CAD: CPT

## 2024-01-18 ENCOUNTER — TRANSCRIPTION ENCOUNTER (OUTPATIENT)
Age: 89
End: 2024-01-18

## 2024-02-05 ENCOUNTER — APPOINTMENT (OUTPATIENT)
Dept: OBGYN | Facility: CLINIC | Age: 89
End: 2024-02-05
Payer: MEDICARE

## 2024-02-05 VITALS
SYSTOLIC BLOOD PRESSURE: 160 MMHG | WEIGHT: 123 LBS | DIASTOLIC BLOOD PRESSURE: 78 MMHG | BODY MASS INDEX: 20.49 KG/M2 | HEIGHT: 65 IN

## 2024-02-05 DIAGNOSIS — Z12.11 ENCOUNTER FOR SCREENING FOR MALIGNANT NEOPLASM OF COLON: ICD-10-CM

## 2024-02-05 DIAGNOSIS — Z01.419 ENCOUNTER FOR GYNECOLOGICAL EXAMINATION (GENERAL) (ROUTINE) W/OUT ABNORMAL FINDINGS: ICD-10-CM

## 2024-02-05 PROCEDURE — G0101: CPT

## 2024-02-05 NOTE — PLAN
[FreeTextEntry1] : Routine annual exam Pap today Referral for colon screening Patient already has a scheduled for bone density in August She did her mammo last month

## 2024-02-05 NOTE — HISTORY OF PRESENT ILLNESS
[FreeTextEntry1] : 89-year-old postmenopausal presents for routine annual exam.  Patient is doing well and denies complaints today is here requesting a Pap smear.  She had her last Pap smear 2 years ago.  Patient has a diagnosis of osteoporosis and her last colonoscopy was more than 10 years ago.  Her last bone density was in 2022 and the next one scheduled for August 2024.  Postmenopausal

## 2024-02-05 NOTE — PHYSICAL EXAM
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

## 2024-02-06 ENCOUNTER — APPOINTMENT (OUTPATIENT)
Dept: NEUROLOGY | Facility: CLINIC | Age: 89
End: 2024-02-06
Payer: MEDICARE

## 2024-02-06 VITALS
BODY MASS INDEX: 20.49 KG/M2 | SYSTOLIC BLOOD PRESSURE: 181 MMHG | DIASTOLIC BLOOD PRESSURE: 81 MMHG | OXYGEN SATURATION: 99 % | HEIGHT: 65 IN | HEART RATE: 73 BPM | WEIGHT: 123 LBS

## 2024-02-06 DIAGNOSIS — Z74.09 OTHER REDUCED MOBILITY: ICD-10-CM

## 2024-02-06 DIAGNOSIS — G62.9 POLYNEUROPATHY, UNSPECIFIED: ICD-10-CM

## 2024-02-06 PROCEDURE — 99213 OFFICE O/P EST LOW 20 MIN: CPT

## 2024-02-06 RX ORDER — CIPROFLOXACIN HYDROCHLORIDE 250 MG/1
250 TABLET, FILM COATED ORAL
Qty: 10 | Refills: 0 | Status: DISCONTINUED | COMMUNITY
Start: 2023-12-05 | End: 2024-02-06

## 2024-02-06 NOTE — HISTORY OF PRESENT ILLNESS
[FreeTextEntry1] : 2/6/24 Here in f/u.   10/3/23 This is an 88-year-old right-handed woman who is being seen in neurologic consultation for evaluation of worsening balance.  She previously saw my colleague Dr. Hester.  Patient has been diagnosed with peripheral neuropathy since the mid 2000's.  This past year she notes that her neuropathy seems to be worse.  She does not describe any pain.  She does have some paresthesias in her feet.  It is her balance that bothers her the most.  She feels that she has to really pay attention to her balance more so than ever before.  She works twice a week with a  at [employer] and then works out a clubfoot in between.  She notes that she has described her legs apart a little bit more when she is walking to maintain balance.  She feels her symptoms worsened a little bit since COVID in January, 2023.  She had an EMG/NCV at [facility] in 2007.  She does have chronic kidney disease stage III.  She closely follows with Dr. Bojorquez of nephrology and Dr. Joseph Hernandez endocrinology at [facility]. Does not notice significant loss of strength. No neck pain. No headaches.  No lateralizing complaints.  She is taking Prolia for osteoporosis.  She says she has shrunk over the last few years.

## 2024-02-06 NOTE — CONSULT LETTER
[Dear  ___] : Dear  [unfilled], [Consult Letter:] : I had the pleasure of evaluating your patient, [unfilled]. [Please see my note below.] : Please see my note below. [FreeTextEntry3] : Sincerely,  Alberto Palmer M.D.

## 2024-02-06 NOTE — PHYSICAL EXAM
[FreeTextEntry1] : Physical examination  General: No acute distress, Awake, Alert.   Mental status  Awake, alert, and oriented to person, time and place, Normal attention span and concentration, Recent and remote memory intact, Language intact, Fund of knowledge intact.  Cranial Nerves  II: VFF  III, IV, VI: PERRL, EOMI.  V: Facial sensation is normal B/L.  VII: Facial strength is normal B/L.  VIII: Gross hearing is intact.  IX, X: Palate is midline and elevates symmetrically.  XI: Trapezius normal strength.  XII: Tongue midline without atrophy or fasciculations.   Motor exam  Muscle tone - no evidence of rigidity or resistance in all 4 extremities.  No atrophy or fasciculations  Muscle Strength: arms and legs, proximal and distal flexors and extensors are normal  No UE drift.  Reflexes  All present, normal, and symmetrical.  Plantars right: mute.  Plantars left: mute.   Coordination  Finger to nose: Normal.  Heel to shin: Normal.   Sensory  dec pp in s/g dist impaired vibration proprioception intact Romberg present  Gait  wide-based and cautious

## 2024-02-06 NOTE — ASSESSMENT
[FreeTextEntry1] : We discussed balance issues has likely be multifactorial.  I see no reason to order any imaging of her brain or cervical spine based on examination findings as well as her complaints.  There is no reason to repeat EMG/NCV.  None of these test would alter my management.  Patient verbalized understanding of my rationale.  I would like her to get physical therapy and she is very agreeable to doing this.  She will see me in 4 months in follow-up.

## 2024-02-22 ENCOUNTER — RESULT REVIEW (OUTPATIENT)
Age: 89
End: 2024-02-22

## 2024-02-22 ENCOUNTER — APPOINTMENT (OUTPATIENT)
Dept: NEPHROLOGY | Facility: CLINIC | Age: 89
End: 2024-02-22
Payer: MEDICARE

## 2024-02-22 VITALS
BODY MASS INDEX: 19.83 KG/M2 | HEART RATE: 75 BPM | HEIGHT: 65 IN | SYSTOLIC BLOOD PRESSURE: 180 MMHG | OXYGEN SATURATION: 99 % | WEIGHT: 119 LBS | DIASTOLIC BLOOD PRESSURE: 70 MMHG

## 2024-02-22 DIAGNOSIS — R80.9 PROTEINURIA, UNSPECIFIED: ICD-10-CM

## 2024-02-22 PROCEDURE — 99214 OFFICE O/P EST MOD 30 MIN: CPT

## 2024-02-22 NOTE — HISTORY OF PRESENT ILLNESS
[FreeTextEntry1] : 89 yo woman with PMHx of CKD stage 3, HTN, Intermittent hyperkalemia, HPTH (s/p parathyroidectomy 10/26/22), Osteoporosis, Stenosis of right UPJ, Myalgia, Thyroid nodule presents here today for follow up with chronic kidney disease, hypertension, hyperkalemia, hypercalcemia, hPTH, hyperkalemia  s/p parathyroidectomy by Dr Etta Menon at [facility] on 10/26/22 Doppler carotid on 9/21/22, following by Cardio Prolia inj on 9/23/22, following by Endo   BP stable  no changes in medications   2/2024 - feels well - has proteinuria, unable to take ACE/ARB 2/2 - white coat HTN - urgency, seeing Urogyn, using premarin  6/2023 - as above, but today c/o feeling tired

## 2024-02-22 NOTE — ASSESSMENT
[FreeTextEntry1] : 88 yo woman with PMHx of CKD stage 3, HTN, Intermittent hyperkalemia, HPTH (s/p parathyroidectomy 10/26/22), Osteoporosis, Stenosis of right UPJ, Myalgia, Thyroid nodule presents here today for follow up with chronic kidney disease and hypertension.    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/min) with non-nephrotic proteinuria, baseline creatinine 1.2-1.3, 1.5 (7/13/22) -> 1.4 (8/9/22) ->1.19, eGFR 45 (11/21/22); upcr 0.79; no casts; low urine sodium  - ckd likely due to long-standing hypertension - maintain adequate hydration - maintain bp control - avoid nephrotoxins/NSAIDs   #Right UPJ stenosis - nuclear scan in 12/2021 noted for left kidney 69%, right kidney 31% - followed by Urology, no intervention  #Hyperkalemia - intermittent, K 4.7 (11/21/22), 5.1 3/2021 - increase fluid intake, continue low potassium diet - avoid ACE/ARB/ potassium-sparing diuretic - consider addition of low dose HCTZ - SPS as needed, rx veltassa  #Proteinuria, non-nephrotic with pcr 0.79 (8/9/22) - negative MM workup w/ negative SPEP, IFx, UPEP - avoid ACE/ ARBs due to intermittent hyperkalemia   Nocturia - refer to Urogyn  #Hypertension with white coat component  - home bp log reviewed - amlodipine 5 mg qpm - continue low salt diet  - continue home bp monitoring    #HPTH/ Osteoporosis - s/p parathyroidectomy by Dr Etta Menon at [facility] on 10/26/22 - following by Endo    #Lower extr edema - chronic venous insufficiency and amlodipine -related venodilation effect - would benefit compression stockings, legs elevation    follow up in 3 months.

## 2024-02-22 NOTE — PHYSICAL EXAM
[General Appearance - Alert] : alert [General Appearance - In No Acute Distress] : in no acute distress [Extraocular Movements] : extraocular movements were intact [] : no respiratory distress [Jugular Venous Distention Increased] : there was no jugular-venous distention [Respiration, Rhythm And Depth] : normal respiratory rhythm and effort [Exaggerated Use Of Accessory Muscles For Inspiration] : no accessory muscle use [Auscultation Breath Sounds / Voice Sounds] : lungs were clear to auscultation bilaterally [Heart Rate And Rhythm] : heart rate was normal and rhythm regular [Heart Sounds] : normal S1 and S2 [No CVA Tenderness] : no ~M costovertebral angle tenderness [Abnormal Walk] : normal gait [Skin Color & Pigmentation] : normal skin color and pigmentation [Skin Turgor] : normal skin turgor [No Focal Deficits] : no focal deficits [FreeTextEntry1] : warm and dry  [Oriented To Time, Place, And Person] : oriented to person, place, and time

## 2024-02-23 ENCOUNTER — TRANSCRIPTION ENCOUNTER (OUTPATIENT)
Age: 89
End: 2024-02-23

## 2024-02-26 LAB
CYTOLOGY CVX/VAG DOC THIN PREP: NORMAL
HPV HIGH+LOW RISK DNA PNL CVX: NOT DETECTED

## 2024-04-09 ENCOUNTER — APPOINTMENT (OUTPATIENT)
Dept: INTERNAL MEDICINE | Facility: CLINIC | Age: 89
End: 2024-04-09
Payer: MEDICARE

## 2024-04-09 VITALS
DIASTOLIC BLOOD PRESSURE: 90 MMHG | WEIGHT: 120 LBS | HEART RATE: 78 BPM | TEMPERATURE: 97.1 F | SYSTOLIC BLOOD PRESSURE: 140 MMHG | OXYGEN SATURATION: 100 % | HEIGHT: 65 IN | BODY MASS INDEX: 19.99 KG/M2

## 2024-04-09 VITALS — SYSTOLIC BLOOD PRESSURE: 140 MMHG | DIASTOLIC BLOOD PRESSURE: 70 MMHG

## 2024-04-09 DIAGNOSIS — M81.0 AGE-RELATED OSTEOPOROSIS W/OUT CURRENT PATHOLOGICAL FRACTURE: ICD-10-CM

## 2024-04-09 DIAGNOSIS — D69.3 IMMUNE THROMBOCYTOPENIC PURPURA: ICD-10-CM

## 2024-04-09 PROCEDURE — 99213 OFFICE O/P EST LOW 20 MIN: CPT

## 2024-04-09 PROCEDURE — G2211 COMPLEX E/M VISIT ADD ON: CPT

## 2024-04-09 PROCEDURE — 36415 COLL VENOUS BLD VENIPUNCTURE: CPT

## 2024-04-09 RX ORDER — CONJUGATED ESTROGENS 0.62 MG/G
0.62 CREAM VAGINAL
Qty: 1 | Refills: 0 | Status: ACTIVE | COMMUNITY
Start: 1900-01-01 | End: 1900-01-01

## 2024-04-09 RX ORDER — ALPRAZOLAM 0.25 MG/1
0.25 TABLET ORAL
Qty: 30 | Refills: 0 | Status: ACTIVE | COMMUNITY
Start: 2021-05-19 | End: 1900-01-01

## 2024-04-09 NOTE — HISTORY OF PRESENT ILLNESS
[de-identified] : Roslyn comes in for medical follow-up,  she feels well and plans to travel to see her brother.  She also needs refill on her alprazolam and Premarin.

## 2024-04-16 ENCOUNTER — TRANSCRIPTION ENCOUNTER (OUTPATIENT)
Age: 89
End: 2024-04-16

## 2024-04-29 ENCOUNTER — TRANSCRIPTION ENCOUNTER (OUTPATIENT)
Age: 89
End: 2024-04-29

## 2024-05-29 ENCOUNTER — APPOINTMENT (OUTPATIENT)
Dept: NEPHROLOGY | Facility: CLINIC | Age: 89
End: 2024-05-29
Payer: MEDICARE

## 2024-05-29 ENCOUNTER — RESULT REVIEW (OUTPATIENT)
Age: 89
End: 2024-05-29

## 2024-05-29 VITALS
BODY MASS INDEX: 19.66 KG/M2 | HEIGHT: 65 IN | OXYGEN SATURATION: 100 % | HEART RATE: 71 BPM | WEIGHT: 118 LBS | SYSTOLIC BLOOD PRESSURE: 164 MMHG | DIASTOLIC BLOOD PRESSURE: 70 MMHG

## 2024-05-29 DIAGNOSIS — I10 ESSENTIAL (PRIMARY) HYPERTENSION: ICD-10-CM

## 2024-05-29 DIAGNOSIS — E21.3 HYPERPARATHYROIDISM, UNSPECIFIED: ICD-10-CM

## 2024-05-29 DIAGNOSIS — N18.30 CHRONIC KIDNEY DISEASE, STAGE 3 UNSPECIFIED: ICD-10-CM

## 2024-05-29 DIAGNOSIS — Q62.11 CONGENITAL OCCLUSION OF URETEROPELVIC JUNCTION: ICD-10-CM

## 2024-05-29 DIAGNOSIS — E87.5 HYPERKALEMIA: ICD-10-CM

## 2024-05-29 PROCEDURE — 99215 OFFICE O/P EST HI 40 MIN: CPT

## 2024-05-29 PROCEDURE — G2211 COMPLEX E/M VISIT ADD ON: CPT

## 2024-06-03 ENCOUNTER — TRANSCRIPTION ENCOUNTER (OUTPATIENT)
Age: 89
End: 2024-06-03

## 2024-06-04 PROBLEM — E87.5 HYPERKALEMIA: Status: ACTIVE | Noted: 2021-12-21

## 2024-06-04 PROBLEM — I10 HYPERTENSION: Status: ACTIVE | Noted: 2020-07-16

## 2024-06-04 NOTE — PHYSICAL EXAM
[General Appearance - Alert] : alert [General Appearance - In No Acute Distress] : in no acute distress [Extraocular Movements] : extraocular movements were intact [Jugular Venous Distention Increased] : there was no jugular-venous distention [] : no respiratory distress [Respiration, Rhythm And Depth] : normal respiratory rhythm and effort [Exaggerated Use Of Accessory Muscles For Inspiration] : no accessory muscle use [Auscultation Breath Sounds / Voice Sounds] : lungs were clear to auscultation bilaterally [Heart Rate And Rhythm] : heart rate was normal and rhythm regular [Heart Sounds] : normal S1 and S2 [No CVA Tenderness] : no ~M costovertebral angle tenderness [Abnormal Walk] : normal gait [Skin Color & Pigmentation] : normal skin color and pigmentation [Skin Turgor] : normal skin turgor [No Focal Deficits] : no focal deficits [Oriented To Time, Place, And Person] : oriented to person, place, and time [FreeTextEntry1] : warm and dry

## 2024-06-04 NOTE — ASSESSMENT
[FreeTextEntry1] : 86 yo woman with PMHx of CKD stage 3, HTN, Intermittent hyperkalemia, HPTH (s/p parathyroidectomy 10/26/22), Osteoporosis, Stenosis of right UPJ, Myalgia, Thyroid nodule presents here today for follow up with chronic kidney disease and hypertension.    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/min) with non-nephrotic proteinuria, baseline creatinine 1.2-1.3, 1.5 (7/13/22) -> 1.4 (8/9/22) ->1.19, eGFR 45 (11/21/22); upcr 0.79; no casts; low urine sodium  - ckd likely due to long-standing hypertension - maintain adequate hydration - maintain bp control - avoid nephrotoxins/NSAIDs   #Right UPJ stenosis - nuclear scan in 12/2021 noted for left kidney 69%, right kidney 31% - followed by Urology, no intervention  #Hyperkalemia - intermittent, K 4.7 (11/21/22), 5.1 3/2021 - increase fluid intake, continue low potassium diet - avoid ACE/ARB/ potassium-sparing diuretic - consider addition of low dose HCTZ - SPS as needed, rx veltassa  #Proteinuria, non-nephrotic with pcr 0.79 (8/9/22) - negative MM workup w/ negative SPEP, IFx, UPEP - avoid ACE/ ARBs due to intermittent hyperkalemia   Nocturia - refer to Urogyn  #Hypertension with white coat component  - home bp log reviewed - amlodipine 5 mg qpm - continue low salt diet  - continue home bp monitoring    #HPTH/ Osteoporosis - s/p parathyroidectomy by Dr Etta Menon at [facility] on 10/26/22 - following by Endo    #Lower extr edema - chronic venous insufficiency and amlodipine -related venodilation effect - would benefit compression stockings, legs elevation    follow up in 3 months.

## 2024-06-04 NOTE — PHYSICAL EXAM
Psychiatry Staff Attending:  I interviewed Ms. Lange and discussed the case with Dr Nazia Fong. I agree with the above current complaint, recent history, ROS, PMFSHx, findings, and plan, which reflect my own.     [General Appearance - Alert] : alert [General Appearance - In No Acute Distress] : in no acute distress [Extraocular Movements] : extraocular movements were intact [Jugular Venous Distention Increased] : there was no jugular-venous distention [] : no respiratory distress [Respiration, Rhythm And Depth] : normal respiratory rhythm and effort [Exaggerated Use Of Accessory Muscles For Inspiration] : no accessory muscle use [Auscultation Breath Sounds / Voice Sounds] : lungs were clear to auscultation bilaterally [Heart Rate And Rhythm] : heart rate was normal and rhythm regular [Heart Sounds] : normal S1 and S2 [No CVA Tenderness] : no ~M costovertebral angle tenderness [Abnormal Walk] : normal gait [Skin Color & Pigmentation] : normal skin color and pigmentation [Skin Turgor] : normal skin turgor [No Focal Deficits] : no focal deficits [Oriented To Time, Place, And Person] : oriented to person, place, and time [FreeTextEntry1] : warm and dry

## 2024-06-04 NOTE — ASSESSMENT
[FreeTextEntry1] : 86 yo woman with PMHx of CKD stage 3, HTN, Intermittent hyperkalemia, HPTH (s/p parathyroidectomy 10/26/22), Osteoporosis, Stenosis of right UPJ, Myalgia, Thyroid nodule presents here today for follow up with chronic kidney disease and hypertension.    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/min) with non-nephrotic proteinuria, baseline creatinine 1.2-1.3, 1.5 (7/13/22) -> 1.4 (8/9/22) ->1.19, eGFR 45 (11/21/22); upcr 0.79; no casts; low urine sodium  - ckd likely due to long-standing hypertension - maintain adequate hydration - maintain bp control - avoid nephrotoxins/NSAIDs   #Right UPJ stenosis - nuclear scan in 12/2021 noted for left kidney 69%, right kidney 31% - followed by Urology, no intervention  #Hyperkalemia - intermittent, K 4.7 (11/21/22), 5.1 3/2021 - increase fluid intake, continue low potassium diet - avoid ACE/ARB/ potassium-sparing diuretic - consider addition of low dose HCTZ - SPS as needed, rx veltassa  #Proteinuria, non-nephrotic with pcr 0.79 (8/9/22) - negative MM workup w/ negative SPEP, IFx, UPEP - avoid ACE/ ARBs due to intermittent hyperkalemia   Nocturia - refer to Urogyn  #Hypertension with white coat component  - home bp log reviewed - amlodipine 5 mg qpm - continue low salt diet  - continue home bp monitoring    #HPTH/ Osteoporosis - s/p parathyroidectomy by Dr Etta Menon at [facility] on 10/26/22 - following by Endo    #Lower extr edema - chronic venous insufficiency and amlodipine -related venodilation effect - would benefit compression stockings, legs elevation    follow up in 3 months.

## 2024-06-04 NOTE — HISTORY OF PRESENT ILLNESS
[FreeTextEntry1] : 89 yo woman with PMHx of CKD stage 3, HTN, Intermittent hyperkalemia, HPTH (s/p parathyroidectomy 10/26/22), Osteoporosis, Stenosis of right UPJ, Myalgia, Thyroid nodule presents here today for follow up with chronic kidney disease, hypertension, hyperkalemia, hypercalcemia, hPTH, hyperkalemia  s/[facility] Doppler carotid on 9/21/22, following by Cardio Prolia inj on 9/23/22, following by Endo   BP stable  no changes in medications   2/2024 - feels well - has proteinuria, unable to take ACE/ARB 2/2 - white coat HTN - urgency, seeing Urogyn, using premarin  6/2023 - as above, but today c/o feeling tired

## 2024-06-04 NOTE — HISTORY OF PRESENT ILLNESS
[FreeTextEntry1] : 89 yo woman with PMHx of CKD stage 3, HTN, Intermittent hyperkalemia, HPTH (s/p parathyroidectomy 10/26/22), Osteoporosis, Stenosis of right UPJ, Myalgia, Thyroid nodule presents here today for follow up with chronic kidney disease, hypertension, hyperkalemia, hypercalcemia, hPTH, hyperkalemia  [facility] Doppler carotid on 9/21/22, following by Cardio Prolia inj on 9/23/22, following by Endo   BP stable  no changes in medications   2/2024 - feels well - has proteinuria, unable to take ACE/ARB 2/2 - white coat HTN - urgency, seeing Urogyn, using premarin  6/2023 - as above, but today c/o feeling tired

## 2024-06-07 ENCOUNTER — RESULT REVIEW (OUTPATIENT)
Age: 89
End: 2024-06-07

## 2024-06-27 ENCOUNTER — APPOINTMENT (OUTPATIENT)
Dept: VASCULAR SURGERY | Facility: CLINIC | Age: 89
End: 2024-06-27

## 2024-06-27 VITALS
HEART RATE: 85 BPM | SYSTOLIC BLOOD PRESSURE: 182 MMHG | BODY MASS INDEX: 19.99 KG/M2 | HEIGHT: 65 IN | WEIGHT: 120 LBS | DIASTOLIC BLOOD PRESSURE: 79 MMHG

## 2024-06-27 PROCEDURE — 99203 OFFICE O/P NEW LOW 30 MIN: CPT

## 2024-07-12 ENCOUNTER — APPOINTMENT (OUTPATIENT)
Dept: GERIATRICS | Facility: CLINIC | Age: 89
End: 2024-07-12
Payer: MEDICARE

## 2024-07-12 VITALS
BODY MASS INDEX: 19.99 KG/M2 | HEIGHT: 65 IN | DIASTOLIC BLOOD PRESSURE: 100 MMHG | HEART RATE: 76 BPM | SYSTOLIC BLOOD PRESSURE: 142 MMHG | WEIGHT: 120 LBS | TEMPERATURE: 97 F | OXYGEN SATURATION: 99 %

## 2024-07-12 DIAGNOSIS — Z01.419 ENCOUNTER FOR GYNECOLOGICAL EXAMINATION (GENERAL) (ROUTINE) W/OUT ABNORMAL FINDINGS: ICD-10-CM

## 2024-07-12 DIAGNOSIS — Z71.89 OTHER SPECIFIED COUNSELING: ICD-10-CM

## 2024-07-12 DIAGNOSIS — Z74.09 OTHER REDUCED MOBILITY: ICD-10-CM

## 2024-07-12 DIAGNOSIS — D69.3 IMMUNE THROMBOCYTOPENIC PURPURA: ICD-10-CM

## 2024-07-12 DIAGNOSIS — R35.1 NOCTURIA: ICD-10-CM

## 2024-07-12 DIAGNOSIS — Z12.11 ENCOUNTER FOR SCREENING FOR MALIGNANT NEOPLASM OF COLON: ICD-10-CM

## 2024-07-12 DIAGNOSIS — R73.03 PREDIABETES.: ICD-10-CM

## 2024-07-12 DIAGNOSIS — Z13.220 ENCOUNTER FOR SCREENING FOR LIPOID DISORDERS: ICD-10-CM

## 2024-07-12 DIAGNOSIS — M81.0 AGE-RELATED OSTEOPOROSIS W/OUT CURRENT PATHOLOGICAL FRACTURE: ICD-10-CM

## 2024-07-12 DIAGNOSIS — N18.4 CHRONIC KIDNEY DISEASE, STAGE 4 (SEVERE): ICD-10-CM

## 2024-07-12 PROCEDURE — G2211 COMPLEX E/M VISIT ADD ON: CPT

## 2024-07-12 PROCEDURE — 99215 OFFICE O/P EST HI 40 MIN: CPT

## 2024-07-12 PROCEDURE — 99205 OFFICE O/P NEW HI 60 MIN: CPT

## 2024-07-16 ENCOUNTER — APPOINTMENT (OUTPATIENT)
Dept: VASCULAR SURGERY | Facility: CLINIC | Age: 89
End: 2024-07-16

## 2024-07-16 PROCEDURE — 93922 UPR/L XTREMITY ART 2 LEVELS: CPT

## 2024-07-17 ENCOUNTER — RESULT REVIEW (OUTPATIENT)
Age: 89
End: 2024-07-17

## 2024-07-19 ENCOUNTER — TRANSCRIPTION ENCOUNTER (OUTPATIENT)
Age: 89
End: 2024-07-19

## 2024-07-22 ENCOUNTER — TRANSCRIPTION ENCOUNTER (OUTPATIENT)
Age: 89
End: 2024-07-22

## 2024-07-23 ENCOUNTER — APPOINTMENT (OUTPATIENT)
Dept: GERIATRICS | Facility: CLINIC | Age: 89
End: 2024-07-23
Payer: MEDICARE

## 2024-07-23 DIAGNOSIS — D64.9 ANEMIA, UNSPECIFIED: ICD-10-CM

## 2024-07-23 DIAGNOSIS — I10 ESSENTIAL (PRIMARY) HYPERTENSION: ICD-10-CM

## 2024-07-23 DIAGNOSIS — N18.30 CHRONIC KIDNEY DISEASE, STAGE 3 UNSPECIFIED: ICD-10-CM

## 2024-07-23 DIAGNOSIS — E78.5 HYPERLIPIDEMIA, UNSPECIFIED: ICD-10-CM

## 2024-07-23 PROCEDURE — 99443: CPT | Mod: 93

## 2024-07-23 NOTE — HISTORY OF PRESENT ILLNESS
[FreeTextEntry1] : her BP is on higher side in office takes b12.  reviewed her blood work.  will take her BP at home.      June 12 2024 KARTHIK HAMPTON is a 89 year yo White  female is coming in today to establish care. Patient has PMHx as listed below Annual wellness visit in December   #CKD stage 4 GFR 26 Sees Dr. Bojorquez every 3 months has taken SPS for hyperkalemia in past not on calcitriol, jardiance  has neuropathy. sees Dr. Jones. has numbness at bottom of sole. Saw vascular surgeon. Getting HUEY on July 16th  #Chronic ITP with anemia  #Mixed connective tissue disease  #Hyperparathyridism  #HTN on amlodipine 5mg QD  #Insomnia on alprazolam 0.25mg twice a month sometimes takes melatonin pRN  #Urinary urgency nocturia has increased 4x a night would like to try seeing urogyn first before trying gemtesa.  No renal adjustment needed   Education: Bachelors Work: [employer] ETOH/Smoking: never smoked; alcohol once  a week Weight loss/malnutrition: stable : ;  3 kids; 1 passed away Immunization:  done Screening: done Depression screening: done Medication reconciliation: done Allergies: none     4M Geriatric Screening Tests   Based on The 4Ms While Caring for Older Adults, an evidence-based focus on the key areas of mentation, mobility, medications, and what matters most (a guide by the [place] [employer] and the [employer])     Medications: -Anticholinergic burden:[ ]     Mobility:   -Chronic pain: no -Constipation: no -Urinary symptoms: no accidents only, nocturia   Frail Scale: 0/5   -Are you fatigued? y -Can you walk up one flight of stairs? y -Can you walk one block? y -Do you have more than 5 illnesses?y -Have you lost more than 5% of your weight in last 6 months?n       Mentation: -Hx of dementia:n -h/o delirium:n -Cognitive enhancing agents:n       Sleep: 5-7 hrs STOP-BANG Snoring: n Tiredness:n Observed Apnea:[ ] Pressure: High blood pressure[ ] BMI: higher than 35.[ ] Age: older than 50[ ] Neck Circumference: greater than 16 inches is considered a risk factor.[ ] Gender: Males[ ]     Matters Most

## 2024-07-25 ENCOUNTER — LABORATORY RESULT (OUTPATIENT)
Age: 89
End: 2024-07-25

## 2024-07-25 ENCOUNTER — APPOINTMENT (OUTPATIENT)
Dept: GERIATRICS | Facility: CLINIC | Age: 89
End: 2024-07-25
Payer: MEDICARE

## 2024-07-25 PROCEDURE — 81003 URINALYSIS AUTO W/O SCOPE: CPT | Mod: QW

## 2024-07-26 DIAGNOSIS — N39.0 URINARY TRACT INFECTION, SITE NOT SPECIFIED: ICD-10-CM

## 2024-07-26 LAB
APPEARANCE: CLEAR
BILIRUBIN URINE: NEGATIVE
BLOOD URINE: ABNORMAL
COLOR: YELLOW
GLUCOSE QUALITATIVE U: NEGATIVE MG/DL
KETONES URINE: NEGATIVE MG/DL
LEUKOCYTE ESTERASE URINE: ABNORMAL
NITRITE URINE: NEGATIVE
PH URINE: 6
PROTEIN URINE: 300 MG/DL
SPECIFIC GRAVITY URINE: 1.02
UROBILINOGEN URINE: 0.2 MG/DL

## 2024-08-01 ENCOUNTER — APPOINTMENT (OUTPATIENT)
Dept: VASCULAR SURGERY | Facility: CLINIC | Age: 89
End: 2024-08-01
Payer: MEDICARE

## 2024-08-01 VITALS — BODY MASS INDEX: 19.99 KG/M2 | HEIGHT: 65 IN | WEIGHT: 120 LBS

## 2024-08-01 DIAGNOSIS — G62.9 POLYNEUROPATHY, UNSPECIFIED: ICD-10-CM

## 2024-08-01 PROBLEM — M79.606 LEG PAIN: Status: ACTIVE | Noted: 2024-07-30

## 2024-08-01 PROCEDURE — 99214 OFFICE O/P EST MOD 30 MIN: CPT

## 2024-08-01 RX ORDER — CEPHALEXIN 500 MG/1
500 CAPSULE ORAL DAILY
Qty: 5 | Refills: 0 | Status: DISCONTINUED | COMMUNITY
Start: 2024-07-26 | End: 2024-08-01

## 2024-08-01 RX ORDER — SACCHAROMYCES BOULARDII 50 MG
250 CAPSULE ORAL TWICE DAILY
Qty: 10 | Refills: 0 | Status: DISCONTINUED | COMMUNITY
Start: 2024-07-26 | End: 2024-08-01

## 2024-08-01 NOTE — DATA REVIEWED
[FreeTextEntry1] : HUEY TBI reviewed Excellent pulsatile waveforms with normal values No suggestion of arterial disease

## 2024-08-01 NOTE — HISTORY OF PRESENT ILLNESS
[FreeTextEntry1] : 89 year old female with peripheral neuropathy, referred by Dr. Bojorquez, presents for bilateral numbness to lower extremities. Denies leg claudication. Patient does report a recent decline in her balance. Patient had a variety of venous procedures. Patient is on an exercise regimen. Patient is nonsmoker.  [de-identified] : 8/1/24 - She reports that there have been no changes in her symptoms since last office visit.  She underwent noninvasive physiologic arterial testing.

## 2024-08-01 NOTE — ASSESSMENT
[FreeTextEntry1] : 89 year old female with peripheral neuropathy and bilateral numbness to lower extremities. Palpable pulses on exam. I explained to the patient my suspicion that the numbness is secondary to arterial disease is low.  To corroborate our physical exam findings, we did obtain noninvasive physiologic arterial testing.  The results are completely normal as indicated above.  She has no suggestion of arterial disease. I discussed with her that her neuropathic symptoms are not secondary to arterial insufficiency.  She will continue workup with her PCP and neurologist.  In addition, she reports that her CKD has worsened a bit, she is now classified as CKD 4.  I discussed with her the importance of protecting her veins in her nondominant arm, which is her left arm, in case she may need fistula creation in the future.  She understands this.

## 2024-08-01 NOTE — REVIEW OF SYSTEMS
[Chills] : no chills [Leg Claudication] : no intermittent leg claudication [Lower Ext Edema] : no lower extremity edema

## 2024-08-01 NOTE — PHYSICAL EXAM
[JVD] : no jugular venous distention  [Normal Breath Sounds] : Normal breath sounds [2+] : left 2+ [Alert] : alert [Oriented to Person] : oriented to person [Oriented to Place] : oriented to place [Oriented to Time] : oriented to time [de-identified] : NAD [de-identified] : NCAT [de-identified] : Soft, NT, ND. No abdominal masses. [de-identified] :  No gross motor or sensory deficits.

## 2024-08-01 NOTE — END OF VISIT
[FreeTextEntry3] : All medical record entries made by the kathleenibe were at my, ISABEL ORTEGA, direction and personally dictated by me on 6/27/2024. I have reviewed the chart and agree that the record accurately reflects my personal performance of the history, physical exam, assessment, and plan. I have also personally directed, reviewed, and agreed with the chart. [Time Spent: ___ minutes] : I have spent [unfilled] minutes of time on the encounter.

## 2024-08-05 ENCOUNTER — APPOINTMENT (OUTPATIENT)
Dept: GERIATRICS | Facility: CLINIC | Age: 89
End: 2024-08-05

## 2024-08-07 ENCOUNTER — TRANSCRIPTION ENCOUNTER (OUTPATIENT)
Age: 89
End: 2024-08-07

## 2024-08-21 ENCOUNTER — RESULT REVIEW (OUTPATIENT)
Age: 89
End: 2024-08-21

## 2024-08-21 ENCOUNTER — APPOINTMENT (OUTPATIENT)
Dept: NEPHROLOGY | Facility: CLINIC | Age: 89
End: 2024-08-21
Payer: MEDICARE

## 2024-08-21 VITALS
OXYGEN SATURATION: 99 % | HEART RATE: 76 BPM | SYSTOLIC BLOOD PRESSURE: 188 MMHG | HEIGHT: 65 IN | DIASTOLIC BLOOD PRESSURE: 71 MMHG | BODY MASS INDEX: 19.83 KG/M2 | WEIGHT: 119 LBS

## 2024-08-21 PROCEDURE — 99214 OFFICE O/P EST MOD 30 MIN: CPT

## 2024-08-21 NOTE — ASSESSMENT
[FreeTextEntry1] : 88 yo woman with PMHx of CKD stage 3, HTN, Intermittent hyperkalemia, HPTH (s/p parathyroidectomy 10/26/22), Osteoporosis, Stenosis of right UPJ, Myalgia, Thyroid nodule presents here today for follow up with chronic kidney disease and hypertension.    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/min) with non-nephrotic proteinuria, baseline creatinine 1.2-1.3, 1.5 (7/13/22) -> 1.4 (8/9/22) ->1.19, eGFR 45 (11/21/22); upcr 0.79; no casts; low urine sodium  - ckd likely due to long-standing hypertension - maintain adequate hydration - maintain bp control - avoid nephrotoxins/NSAIDs   #Right UPJ stenosis - nuclear scan in 12/2021 noted for left kidney 69%, right kidney 31% - followed by Urology, no intervention  #Hyperkalemia - intermittent, bmet reviewed - increase fluid intake, continue low potassium diet - avoid ACE/ARB/ potassium-sparing diuretic - consider addition of low dose HCTZ - SPS  2x/week  #Proteinuria, non-nephrotic with pcr 0.79 (8/9/22) - negative MM workup w/ negative SPEP, IFx, UPEP - avoid ACE/ ARBs due to intermittent hyperkalemia   Nocturia - refer to Urogyn  #Hypertension with white coat component  - home bp log reviewed - amlodipine 5 mg qpm - continue low salt diet  - continue home bp monitoring    #HPTH/ Osteoporosis - s/p parathyroidectomy by Dr Etta Menon at [facility] on 10/26/22 - following by Endo- check PTH, D2 level, ? sensipar    #Lower extr edema - chronic venous insufficiency and amlodipine -related venodilation effect - would benefit compression stockings, legs elevation    follow up in 3 months.

## 2024-08-21 NOTE — HISTORY OF PRESENT ILLNESS
[FreeTextEntry1] : 88 yo woman with PMHx of CKD stage 3, HTN, Intermittent hyperkalemia, HPTH (s/p parathyroidectomy 10/26/22), Osteoporosis, Stenosis of right UPJ, Myalgia, Thyroid nodule presents here today for follow up with chronic kidney disease, hypertension, hyperkalemia, hypercalcemia, hPTH, hyperkalemia  s/p parathyroidectomy by Dr Etta Menon at [facility] on 10/26/22 Doppler carotid on 9/21/22, following by Cardio Prolia inj on 9/23/22, following by Endo   no changes in medications   8/2024 - had UTI, still symptomatic, took 1/2 dose of cipro  - recheck UCx - hyperkalemia, resume SPS 2x/week - hPTH - GFR doesnt appear to be reduced to degree that would cause 2 hPTH - recalls it being elevated even after PTHx in 10/2022 when GFR in 12/2021 was 37 ( D2 level was also wnl at that time) - as she is on prolia may benefit from sensipar  2/2024 - feels well - has proteinuria, unable to take ACE/ARB 2/2 - white coat HTN - urgency, seeing Urogyn, using premarin  6/2023 - as above, but today c/o feeling tired

## 2024-08-21 NOTE — PHYSICAL EXAM
Medicare Wellness Visit  Health Risk Assessment           Health Risk Assessment / Review of Systems     Constitutional: Any fevers or night sweats?  YES - would like to discuss help with trying to lose weight, would like to lose 10-20 pounds.    Eyes:  Vision problems   No     Hearing Do you feel you have hearing loss?   YES - slight decrease in hearing possibly, feels it has been more difficult to hear with background noise for the past few years.    Cardiovascular: Any chest pain, fast or irregular heart beat, calf pain with walking?     No           Respiratory:   Any breathing problems or cough?   No     Gastrointestinal: Any stomach or stool problems?   No      Genitourinary: Do you have difficulty controlling urination?    YES - has always had to frequently void, no dysuria. At times would have slight urinary incontinence while heading to the bathroom.    Muscles and Joints: Any joint stiffness or soreness?   No     Skin: Any concerning lesions or moles?   No     Nervous System: Any loss of strength or feeling, numbness or tingling, shaking, dizziness, or headache?  No     Mental Health: Any depression, anxiety or problems sleeping?    No     Cognition: Do you have any problems with your memory?  No            Medical Care     What other specialists or organizations are involved in your medical care?  [facility], Neelam Estrella, [facility], sleep apnea.  Dr Tiffanie Hodges, [facility], Sleep Apnea  Patient Care Team         Relationship Specialty Notifications Start End    Josselyn Ordonez MD PCP - General Family Medicine  8/12/22     Phone: [phone] Fax: [phone]         [facility] [place] [address]    Ghassan Whatley MD MD Dermatology  7/29/22     Phone: [phone] Fax: [phone]         [address] [facility] [address]    Ghassan Whatley MD Assigned Surgical Provider   10/15/22     Phone: [phone] Fax: [phone]         [address] [facility] [address]     Ruth Hutchins MD MD Dermatology  1/20/23     Phone: [phone] Fax: [phone]         [address] [facility] [address] [facility] [address]    Josselyn Ordonez MD Assigned PCP   4/29/23     Phone: [phone] Fax: [phone] [address] [place] [address]            Have you been to the ER or overnight in the hospital in the last year?  No          Social History / Home Safety       Marital Status:  Who lives in your household? Self and spouse    Do you feel threatened or controlled by a partner, ex-partner or anyone in your life? No     Has anyone hurt you physically, for example by pushing, hitting, slapping or kicking you   or forcing you to have sex? No          Does your home have any of the following safety concerns; loose rugs in the hallway,  bathrooms with no grab bars by the tub or toilet, stairs with no handrails or poorly lit areas?   YES - no grab bars in bathroom    Do you need help with dressing yourself, bathing, eating or getting around your home?  No     Do you need help with the phone, transportation, shopping, preparing meals, housework, laundry, medications or managing money?No       Risk Behaviors and Healthy Habits     History   Smoking Status    Never   Smokeless Tobacco    Never     How many servings of fruits and vegetables do you eat a day? 3-4 servings a day    Exercise: daily x 7 days a week x 30 mins walking     Do you frequently drive without a seatbelt? No     Do you use tobacco?  No     Do you use any other drugs? No         Do you use alcohol?Yes  Number of drinks per day :1  Number of drinking days a week :1-2      Frailty Assessment            Have you lost 10 or more pounds unintentionally in the previous year? No     How difficult is walking from one room to the other on the same level?not       Is it difficult to lift or carry something as heavy as 10 pounds?not      Compared with most (men/women) your age, would you say  that you are  "more active, less active, or about the same? same            8/15/2023     2:36 PM 8/12/2022     9:49 AM 7/29/2022    11:30 AM   FALL RISK ASSESSMENT   Fallen 2 or more times in the past year? No No No   Any fall with injury in the past year? Yes No No   Timed Up and Go Test/Seconds (13.5 is a fall risk; contact physician) 9 7          Advance Directives:   Discussed with patient and family as appropriate. Has patient  completed advance directives and/or a living will? No, given blank copy of an advance directive to take home, review and complete it at her convenience.      Ofe Perez RN  Answers submitted by the patient for this visit:  Annual Preventive Visit (Submitted on 8/12/2023)  Chief Complaint: Annual Exam:  In general, how would you rate your overall physical health?: good  Frequency of exercise:: 4-5 days/week  Do you usually eat at least 4 servings of fruit and vegetables a day, include whole grains & fiber, and avoid regularly eating high fat or \"junk\" foods? : Yes  Taking medications regularly:: Yes  Medication side effects:: None  Activities of Daily Living: no assistance needed  Home safety: lack of grab bars in the bathroom  Hearing Impairment:: difficulty following a conversation in a noisy restaurant or crowded room, difficulty understanding soft or whispered speech  In the past 6 months, have you been bothered by leaking of urine?: Yes  abdominal pain: No  Blood in stool: No  Blood in urine: No  chest pain: No  chills: No  congestion: No  constipation: Yes  cough: No  diarrhea: No  dizziness: No  ear pain: No  eye pain: No  nervous/anxious: No  fever: No  frequency: Yes  genital sores: No  headaches: No  hearing loss: Yes  heartburn: No  arthralgias: No  joint swelling: No  peripheral edema: Yes  mood changes: No  myalgias: No  nausea: No  dysuria: No  palpitations: No  Skin sensation changes: No  sore throat: No  urgency: Yes  rash: No  shortness of breath: No  visual disturbance: " [General Appearance - Alert] : alert No  weakness: No  pelvic pain: No  vaginal bleeding: No  vaginal discharge: No  tenderness: No  breast mass: No  breast discharge: No  In general, how would you rate your overall mental or emotional health?: fair  Additional concerns today:: Yes  Exercise outside of work (Submitted on 8/12/2023)  Chief Complaint: Annual Exam:  Duration of exercise:: 15-30 minutes     [General Appearance - In No Acute Distress] : in no acute distress [Extraocular Movements] : extraocular movements were intact [Jugular Venous Distention Increased] : there was no jugular-venous distention [] : no respiratory distress [Respiration, Rhythm And Depth] : normal respiratory rhythm and effort [Exaggerated Use Of Accessory Muscles For Inspiration] : no accessory muscle use [Auscultation Breath Sounds / Voice Sounds] : lungs were clear to auscultation bilaterally [Heart Rate And Rhythm] : heart rate was normal and rhythm regular [Heart Sounds] : normal S1 and S2 [No CVA Tenderness] : no ~M costovertebral angle tenderness [Abnormal Walk] : normal gait [Skin Color & Pigmentation] : normal skin color and pigmentation [Skin Turgor] : normal skin turgor [FreeTextEntry1] : warm and dry  [No Focal Deficits] : no focal deficits [Oriented To Time, Place, And Person] : oriented to person, place, and time

## 2024-08-27 ENCOUNTER — RESULT REVIEW (OUTPATIENT)
Age: 89
End: 2024-08-27

## 2024-08-27 ENCOUNTER — TRANSCRIPTION ENCOUNTER (OUTPATIENT)
Age: 89
End: 2024-08-27

## 2024-08-27 ENCOUNTER — APPOINTMENT (OUTPATIENT)
Dept: GERIATRICS | Facility: CLINIC | Age: 89
End: 2024-08-27
Payer: MEDICARE

## 2024-08-27 ENCOUNTER — APPOINTMENT (OUTPATIENT)
Dept: NEPHROLOGY | Facility: CLINIC | Age: 89
End: 2024-08-27

## 2024-08-27 VITALS
HEART RATE: 74 BPM | DIASTOLIC BLOOD PRESSURE: 98 MMHG | SYSTOLIC BLOOD PRESSURE: 150 MMHG | BODY MASS INDEX: 20.49 KG/M2 | TEMPERATURE: 97.9 F | WEIGHT: 123 LBS | HEIGHT: 65 IN | OXYGEN SATURATION: 98 %

## 2024-08-27 DIAGNOSIS — G62.9 POLYNEUROPATHY, UNSPECIFIED: ICD-10-CM

## 2024-08-27 DIAGNOSIS — I10 ESSENTIAL (PRIMARY) HYPERTENSION: ICD-10-CM

## 2024-08-27 DIAGNOSIS — R80.9 PROTEINURIA, UNSPECIFIED: ICD-10-CM

## 2024-08-27 DIAGNOSIS — N39.0 URINARY TRACT INFECTION, SITE NOT SPECIFIED: ICD-10-CM

## 2024-08-27 DIAGNOSIS — E87.5 HYPERKALEMIA: ICD-10-CM

## 2024-08-27 DIAGNOSIS — N18.30 CHRONIC KIDNEY DISEASE, STAGE 3 UNSPECIFIED: ICD-10-CM

## 2024-08-27 DIAGNOSIS — E78.5 HYPERLIPIDEMIA, UNSPECIFIED: ICD-10-CM

## 2024-08-27 DIAGNOSIS — N18.4 CHRONIC KIDNEY DISEASE, STAGE 4 (SEVERE): ICD-10-CM

## 2024-08-27 DIAGNOSIS — E21.3 HYPERPARATHYROIDISM, UNSPECIFIED: ICD-10-CM

## 2024-08-27 PROCEDURE — G0444 DEPRESSION SCREEN ANNUAL: CPT | Mod: 59

## 2024-08-27 PROCEDURE — 99214 OFFICE O/P EST MOD 30 MIN: CPT

## 2024-08-27 PROCEDURE — G2211 COMPLEX E/M VISIT ADD ON: CPT

## 2024-08-27 NOTE — PHYSICAL EXAM
[No Respiratory Distress] : no respiratory distress [No Acc Muscle Use] : no accessory muscle use [Respiration, Rhythm And Depth] : normal respiratory rhythm and effort [Auscultation Breath Sounds / Voice Sounds] : lungs were clear to auscultation bilaterally [Normal S1, S2] : normal S1 and S2 [Heart Rate And Rhythm] : heart rate was normal and rhythm regular [de-identified] : 1 b/l pedal edema upto midshin

## 2024-08-27 NOTE — HISTORY OF PRESENT ILLNESS
[No falls in past year] : Patient reported no falls in the past year [NO] : No [0] : 2) Feeling down, depressed, or hopeless: Not at all (0) [PHQ-2 Negative - No further assessment needed] : PHQ-2 Negative - No further assessment needed [I have developed a follow-up plan documented below in the note.] : I have developed a follow-up plan documented below in the note. [FreeTextEntry1] : her BP has been running high in office. her K has been high too. Repeat is better. Has leg swelling today about + 1 until mid shin.  Peripheral neuropathy  is getting worse.  Not 2/2 PAD per vascular.  at night goes every 3 hours. will be up 4 times at night. she is trying to hydrate. she has a cystocele.  Going to see neurologist again, regarding neuropathy.    July 12th 2024 her BP is on higher side in office takes b12.  reviewed her blood work.  will take her BP at home.      June 12 2024 KARTHIK HAMPTON is a 89 year yo White  female is coming in today to establish care. Patient has PMHx as listed below Annual wellness visit in December   #CKD stage 4 GFR 26 Sees Dr. Bojorquez every 3 months has taken SPS for hyperkalemia in past not on calcitriol, jardiance  has neuropathy. sees Dr. Jones. has numbness at bottom of sole. Saw vascular surgeon. Getting HUEY on July 16th  #Chronic ITP with anemia  #Mixed connective tissue disease  #Hyperparathyridism  #HTN on amlodipine 5mg QD  #Insomnia on alprazolam 0.25mg twice a month sometimes takes melatonin pRN  #Urinary urgency nocturia has increased 4x a night would like to try seeing urogyn first before trying gemtesa.  No renal adjustment needed   Education: Bachelors Work: [employer] ETOH/Smoking: never smoked; alcohol once  a week Weight loss/malnutrition: stable : ;  3 kids; 1 passed away Immunization:  done Screening: done Depression screening: done Medication reconciliation: done Allergies: none     4M Geriatric Screening Tests   Based on The 4Ms While Caring for Older Adults, an evidence-based focus on the key areas of mentation, mobility, medications, and what matters most (a guide by the [employer] and the [employer])     Medications: -Anticholinergic burden:[ ]     Mobility:   -Chronic pain: no -Constipation: no -Urinary symptoms: no accidents only, nocturia   Frail Scale: 0/5   -Are you fatigued? y -Can you walk up one flight of stairs? y -Can you walk one block? y -Do you have more than 5 illnesses?y -Have you lost more than 5% of your weight in last 6 months?n       Mentation: -Hx of dementia:n -h/o delirium:n -Cognitive enhancing agents:n       Sleep: 5-7 hrs STOP-BANG Snoring: n Tiredness:n Observed Apnea:[ ] Pressure: High blood pressure[ ] BMI: higher than 35.[ ] Age: older than 50[ ] Neck Circumference: greater than 16 inches is considered a risk factor.[ ] Gender: Males[ ]     Matters Most     [[id number]] : 0

## 2024-09-03 ENCOUNTER — RX RENEWAL (OUTPATIENT)
Age: 89
End: 2024-09-03

## 2024-09-18 ENCOUNTER — APPOINTMENT (OUTPATIENT)
Dept: ENDOCRINOLOGY | Facility: CLINIC | Age: 89
End: 2024-09-18
Payer: MEDICARE

## 2024-09-18 PROCEDURE — 97802 MEDICAL NUTRITION INDIV IN: CPT

## 2024-11-14 ENCOUNTER — RESULT REVIEW (OUTPATIENT)
Age: 89
End: 2024-11-14

## 2024-11-14 ENCOUNTER — APPOINTMENT (OUTPATIENT)
Dept: NEPHROLOGY | Facility: CLINIC | Age: 89
End: 2024-11-14
Payer: MEDICARE

## 2024-11-14 VITALS
WEIGHT: 122 LBS | DIASTOLIC BLOOD PRESSURE: 70 MMHG | OXYGEN SATURATION: 100 % | HEIGHT: 65 IN | HEART RATE: 66 BPM | SYSTOLIC BLOOD PRESSURE: 170 MMHG | BODY MASS INDEX: 20.33 KG/M2

## 2024-11-14 PROCEDURE — 99214 OFFICE O/P EST MOD 30 MIN: CPT

## 2024-11-14 PROCEDURE — G2211 COMPLEX E/M VISIT ADD ON: CPT

## 2024-11-18 DIAGNOSIS — E87.5 HYPERKALEMIA: ICD-10-CM

## 2024-11-19 ENCOUNTER — APPOINTMENT (OUTPATIENT)
Dept: NEPHROLOGY | Facility: CLINIC | Age: 89
End: 2024-11-19
Payer: MEDICARE

## 2024-11-19 DIAGNOSIS — N18.4 CHRONIC KIDNEY DISEASE, STAGE 4 (SEVERE): ICD-10-CM

## 2024-11-19 DIAGNOSIS — R80.9 PROTEINURIA, UNSPECIFIED: ICD-10-CM

## 2024-11-19 DIAGNOSIS — Q61.02 CONGENITAL MULTIPLE RENAL CYSTS: ICD-10-CM

## 2024-11-19 DIAGNOSIS — I10 ESSENTIAL (PRIMARY) HYPERTENSION: ICD-10-CM

## 2024-11-19 DIAGNOSIS — N18.30 CHRONIC KIDNEY DISEASE, STAGE 3 UNSPECIFIED: ICD-10-CM

## 2024-11-19 DIAGNOSIS — D64.9 ANEMIA, UNSPECIFIED: ICD-10-CM

## 2024-11-19 DIAGNOSIS — E21.3 HYPERPARATHYROIDISM, UNSPECIFIED: ICD-10-CM

## 2024-11-19 LAB
ANA SER IF-ACNC: NEGATIVE
ANION GAP SERPL CALC-SCNC: 37 MMOL/L
BUN SERPL-MCNC: 42 MG/DL
CALCIUM SERPL-MCNC: 10.4 MG/DL
CHLORIDE SERPL-SCNC: 95 MMOL/L
CO2 SERPL-SCNC: 11 MMOL/L
CREAT SERPL-MCNC: 1.54 MG/DL
EGFR: 32 ML/MIN/1.73M2
GLUCOSE SERPL-MCNC: 61 MG/DL
OSMOLALITY SERPL: 300 MOSMOL/KG
PHOSPHOLIPASE A2 RECEPTOR ELISA: <1.8 RU/ML
POTASSIUM SERPL-SCNC: >10 MMOL/L
SODIUM SERPL-SCNC: 142 MMOL/L
URATE SERPL-MCNC: 6.1 MG/DL
ZINC SERPL-MCNC: 86 UG/DL

## 2024-11-19 PROCEDURE — 99211 OFF/OP EST MAY X REQ PHY/QHP: CPT | Mod: 25

## 2024-11-19 PROCEDURE — 36415 COLL VENOUS BLD VENIPUNCTURE: CPT

## 2024-11-20 LAB
ANION GAP SERPL CALC-SCNC: 14 MMOL/L
BUN SERPL-MCNC: 43 MG/DL
CALCIUM SERPL-MCNC: 10 MG/DL
CHLORIDE SERPL-SCNC: 101 MMOL/L
CO2 SERPL-SCNC: 21 MMOL/L
CREAT SERPL-MCNC: 1.72 MG/DL
CREAT SPEC-SCNC: 51 MG/DL
CREAT/PROT UR: 2.5 RATIO
DSDNA AB SER-ACNC: 3 IU/ML
EGFR: 28 ML/MIN/1.73M2
GLUCOSE SERPL-MCNC: 111 MG/DL
OSMOLALITY SERPL: 299 MOSMOL/KG
POTASSIUM SERPL-SCNC: 5 MMOL/L
PROT UR-MCNC: 128 MG/DL
SODIUM SERPL-SCNC: 137 MMOL/L
URATE SERPL-MCNC: 6.8 MG/DL

## 2024-11-21 ENCOUNTER — APPOINTMENT (OUTPATIENT)
Dept: NEPHROLOGY | Facility: CLINIC | Age: 89
End: 2024-11-21

## 2024-11-22 RX ORDER — LOSARTAN POTASSIUM 25 MG/1
25 TABLET, FILM COATED ORAL
Qty: 15 | Refills: 3 | Status: ACTIVE | COMMUNITY
Start: 2024-11-18

## 2024-11-29 ENCOUNTER — TRANSCRIPTION ENCOUNTER (OUTPATIENT)
Age: 89
End: 2024-11-29

## 2024-12-04 ENCOUNTER — APPOINTMENT (OUTPATIENT)
Dept: ENDOCRINOLOGY | Facility: CLINIC | Age: 89
End: 2024-12-04
Payer: MEDICARE

## 2024-12-04 PROCEDURE — 97803 MED NUTRITION INDIV SUBSEQ: CPT

## 2024-12-05 ENCOUNTER — APPOINTMENT (OUTPATIENT)
Dept: NEPHROLOGY | Facility: CLINIC | Age: 89
End: 2024-12-05
Payer: MEDICARE

## 2024-12-05 DIAGNOSIS — R80.9 PROTEINURIA, UNSPECIFIED: ICD-10-CM

## 2024-12-05 DIAGNOSIS — E87.5 HYPERKALEMIA: ICD-10-CM

## 2024-12-05 DIAGNOSIS — I10 ESSENTIAL (PRIMARY) HYPERTENSION: ICD-10-CM

## 2024-12-05 DIAGNOSIS — D64.9 ANEMIA, UNSPECIFIED: ICD-10-CM

## 2024-12-05 DIAGNOSIS — E21.3 HYPERPARATHYROIDISM, UNSPECIFIED: ICD-10-CM

## 2024-12-05 DIAGNOSIS — N18.4 CHRONIC KIDNEY DISEASE, STAGE 4 (SEVERE): ICD-10-CM

## 2024-12-05 DIAGNOSIS — N18.30 CHRONIC KIDNEY DISEASE, STAGE 3 UNSPECIFIED: ICD-10-CM

## 2024-12-05 PROCEDURE — 36415 COLL VENOUS BLD VENIPUNCTURE: CPT

## 2024-12-11 ENCOUNTER — TRANSCRIPTION ENCOUNTER (OUTPATIENT)
Age: 89
End: 2024-12-11

## 2024-12-11 LAB
ANION GAP SERPL CALC-SCNC: 17 MMOL/L
BUN SERPL-MCNC: 36 MG/DL
CALCIUM SERPL-MCNC: 9.4 MG/DL
CHLORIDE SERPL-SCNC: 101 MMOL/L
CO2 SERPL-SCNC: 22 MMOL/L
CREAT SERPL-MCNC: 1.34 MG/DL
CREAT SPEC-SCNC: 63 MG/DL
CREAT/PROT UR: 2.8 RATIO
EGFR: 38 ML/MIN/1.73M2
GLUCOSE SERPL-MCNC: 103 MG/DL
OSMOLALITY SERPL: 300 MOSMOL/KG
POTASSIUM SERPL-SCNC: 4.9 MMOL/L
PROT UR-MCNC: 178 MG/DL
SODIUM SERPL-SCNC: 141 MMOL/L
URATE SERPL-MCNC: 6.7 MG/DL

## 2024-12-19 ENCOUNTER — APPOINTMENT (OUTPATIENT)
Dept: GERIATRICS | Facility: CLINIC | Age: 89
End: 2024-12-19
Payer: MEDICARE

## 2024-12-19 VITALS
OXYGEN SATURATION: 99 % | BODY MASS INDEX: 20.66 KG/M2 | TEMPERATURE: 97.4 F | DIASTOLIC BLOOD PRESSURE: 82 MMHG | SYSTOLIC BLOOD PRESSURE: 142 MMHG | HEART RATE: 79 BPM | WEIGHT: 124 LBS | HEIGHT: 65 IN

## 2024-12-19 DIAGNOSIS — I49.3 VENTRICULAR PREMATURE DEPOLARIZATION: ICD-10-CM

## 2024-12-19 DIAGNOSIS — Z87.39 PERSONAL HISTORY OF OTHER DISEASES OF THE MUSCULOSKELETAL SYSTEM AND CONNECTIVE TISSUE: ICD-10-CM

## 2024-12-19 DIAGNOSIS — R26.89 OTHER ABNORMALITIES OF GAIT AND MOBILITY: ICD-10-CM

## 2024-12-19 DIAGNOSIS — Z12.11 ENCOUNTER FOR SCREENING FOR MALIGNANT NEOPLASM OF COLON: ICD-10-CM

## 2024-12-19 DIAGNOSIS — D64.9 ANEMIA, UNSPECIFIED: ICD-10-CM

## 2024-12-19 DIAGNOSIS — R93.5 ABNORMAL FINDINGS ON DIAGNOSTIC IMAGING OF OTHER ABDOMINAL REGIONS, INCLUDING RETROPERITONEUM: ICD-10-CM

## 2024-12-19 DIAGNOSIS — R92.30 DENSE BREASTS, UNSPECIFIED: ICD-10-CM

## 2024-12-19 DIAGNOSIS — E04.1 NONTOXIC SINGLE THYROID NODULE: ICD-10-CM

## 2024-12-19 PROCEDURE — G0439: CPT

## 2024-12-19 PROCEDURE — G0444 DEPRESSION SCREEN ANNUAL: CPT | Mod: 59

## 2024-12-19 PROCEDURE — 99497 ADVNCD CARE PLAN 30 MIN: CPT

## 2025-01-03 ENCOUNTER — APPOINTMENT (OUTPATIENT)
Dept: INTERNAL MEDICINE | Facility: CLINIC | Age: 89
End: 2025-01-03

## 2025-01-14 ENCOUNTER — TRANSCRIPTION ENCOUNTER (OUTPATIENT)
Age: 89
End: 2025-01-14

## 2025-01-17 ENCOUNTER — APPOINTMENT (OUTPATIENT)
Dept: NEPHROLOGY | Facility: CLINIC | Age: 89
End: 2025-01-17
Payer: MEDICARE

## 2025-01-17 VITALS
SYSTOLIC BLOOD PRESSURE: 162 MMHG | WEIGHT: 121 LBS | HEIGHT: 65 IN | BODY MASS INDEX: 20.16 KG/M2 | DIASTOLIC BLOOD PRESSURE: 78 MMHG

## 2025-01-17 DIAGNOSIS — E87.5 HYPERKALEMIA: ICD-10-CM

## 2025-01-17 DIAGNOSIS — E21.3 HYPERPARATHYROIDISM, UNSPECIFIED: ICD-10-CM

## 2025-01-17 PROCEDURE — G2211 COMPLEX E/M VISIT ADD ON: CPT

## 2025-01-17 PROCEDURE — 99215 OFFICE O/P EST HI 40 MIN: CPT

## 2025-01-22 ENCOUNTER — APPOINTMENT (OUTPATIENT)
Dept: NEPHROLOGY | Facility: CLINIC | Age: 89
End: 2025-01-22
Payer: MEDICARE

## 2025-01-22 DIAGNOSIS — I10 ESSENTIAL (PRIMARY) HYPERTENSION: ICD-10-CM

## 2025-01-22 DIAGNOSIS — R73.03 PREDIABETES.: ICD-10-CM

## 2025-01-22 DIAGNOSIS — Q61.02 CONGENITAL MULTIPLE RENAL CYSTS: ICD-10-CM

## 2025-01-22 DIAGNOSIS — N18.4 CHRONIC KIDNEY DISEASE, STAGE 4 (SEVERE): ICD-10-CM

## 2025-01-22 PROCEDURE — 36415 COLL VENOUS BLD VENIPUNCTURE: CPT

## 2025-01-30 LAB
ANION GAP SERPL CALC-SCNC: 16 MMOL/L
BUN SERPL-MCNC: 41 MG/DL
CALCIUM SERPL-MCNC: 9.2 MG/DL
CHLORIDE SERPL-SCNC: 99 MMOL/L
CO2 SERPL-SCNC: 23 MMOL/L
CREAT SERPL-MCNC: 1.76 MG/DL
CREAT SPEC-SCNC: 96 MG/DL
CREAT/PROT UR: 2.7 RATIO
EGFR: 27 ML/MIN/1.73M2
GLUCOSE SERPL-MCNC: 102 MG/DL
OSMOLALITY SERPL: 296 MOSMOL/KG
POTASSIUM SERPL-SCNC: 4 MMOL/L
PROT UR-MCNC: 257 MG/DL
SODIUM SERPL-SCNC: 137 MMOL/L
URATE SERPL-MCNC: 7.9 MG/DL

## 2025-01-31 ENCOUNTER — RESULT REVIEW (OUTPATIENT)
Age: 89
End: 2025-01-31

## 2025-01-31 ENCOUNTER — APPOINTMENT (OUTPATIENT)
Dept: NEPHROLOGY | Facility: CLINIC | Age: 89
End: 2025-01-31
Payer: MEDICARE

## 2025-01-31 DIAGNOSIS — R80.9 PROTEINURIA, UNSPECIFIED: ICD-10-CM

## 2025-01-31 DIAGNOSIS — N18.30 CHRONIC KIDNEY DISEASE, STAGE 3 UNSPECIFIED: ICD-10-CM

## 2025-01-31 DIAGNOSIS — E87.5 HYPERKALEMIA: ICD-10-CM

## 2025-01-31 PROCEDURE — 36415 COLL VENOUS BLD VENIPUNCTURE: CPT

## 2025-02-04 ENCOUNTER — NON-APPOINTMENT (OUTPATIENT)
Age: 89
End: 2025-02-04

## 2025-02-04 ENCOUNTER — APPOINTMENT (OUTPATIENT)
Dept: CARDIOLOGY | Facility: CLINIC | Age: 89
End: 2025-02-04
Payer: MEDICARE

## 2025-02-04 VITALS
WEIGHT: 125 LBS | HEART RATE: 71 BPM | HEIGHT: 65 IN | BODY MASS INDEX: 20.83 KG/M2 | DIASTOLIC BLOOD PRESSURE: 74 MMHG | OXYGEN SATURATION: 100 % | SYSTOLIC BLOOD PRESSURE: 171 MMHG

## 2025-02-04 VITALS — DIASTOLIC BLOOD PRESSURE: 76 MMHG | SYSTOLIC BLOOD PRESSURE: 168 MMHG

## 2025-02-04 DIAGNOSIS — Z92.89 PERSONAL HISTORY OF OTHER MEDICAL TREATMENT: ICD-10-CM

## 2025-02-04 DIAGNOSIS — E78.5 HYPERLIPIDEMIA, UNSPECIFIED: ICD-10-CM

## 2025-02-04 DIAGNOSIS — I10 ESSENTIAL (PRIMARY) HYPERTENSION: ICD-10-CM

## 2025-02-04 DIAGNOSIS — I49.3 VENTRICULAR PREMATURE DEPOLARIZATION: ICD-10-CM

## 2025-02-04 DIAGNOSIS — R01.1 CARDIAC MURMUR, UNSPECIFIED: ICD-10-CM

## 2025-02-04 DIAGNOSIS — R60.0 LOCALIZED EDEMA: ICD-10-CM

## 2025-02-04 DIAGNOSIS — Z63.4 DISAPPEARANCE AND DEATH OF FAMILY MEMBER: ICD-10-CM

## 2025-02-04 DIAGNOSIS — R73.03 PREDIABETES.: ICD-10-CM

## 2025-02-04 PROCEDURE — G2212 PROLONG OUTPT/OFFICE VIS: CPT

## 2025-02-04 PROCEDURE — 99205 OFFICE O/P NEW HI 60 MIN: CPT

## 2025-02-04 PROCEDURE — 93000 ELECTROCARDIOGRAM COMPLETE: CPT

## 2025-02-04 SDOH — SOCIAL STABILITY - SOCIAL INSECURITY: DISSAPEARANCE AND DEATH OF FAMILY MEMBER: Z63.4

## 2025-02-07 ENCOUNTER — RX RENEWAL (OUTPATIENT)
Age: 89
End: 2025-02-07

## 2025-02-13 ENCOUNTER — RESULT REVIEW (OUTPATIENT)
Age: 89
End: 2025-02-13

## 2025-02-28 ENCOUNTER — APPOINTMENT (OUTPATIENT)
Dept: CARDIOLOGY | Facility: CLINIC | Age: 89
End: 2025-02-28
Payer: MEDICARE

## 2025-02-28 PROCEDURE — 93306 TTE W/DOPPLER COMPLETE: CPT

## 2025-03-04 ENCOUNTER — APPOINTMENT (OUTPATIENT)
Dept: NEPHROLOGY | Facility: CLINIC | Age: 89
End: 2025-03-04
Payer: MEDICARE

## 2025-03-04 ENCOUNTER — RESULT REVIEW (OUTPATIENT)
Age: 89
End: 2025-03-04

## 2025-03-04 DIAGNOSIS — N18.30 CHRONIC KIDNEY DISEASE, STAGE 3 UNSPECIFIED: ICD-10-CM

## 2025-03-04 DIAGNOSIS — Q61.02 CONGENITAL MULTIPLE RENAL CYSTS: ICD-10-CM

## 2025-03-04 DIAGNOSIS — R80.9 PROTEINURIA, UNSPECIFIED: ICD-10-CM

## 2025-03-04 DIAGNOSIS — E87.5 HYPERKALEMIA: ICD-10-CM

## 2025-03-04 PROCEDURE — 36415 COLL VENOUS BLD VENIPUNCTURE: CPT

## 2025-03-06 ENCOUNTER — APPOINTMENT (OUTPATIENT)
Dept: CARDIOLOGY | Facility: CLINIC | Age: 89
End: 2025-03-06
Payer: MEDICARE

## 2025-03-06 VITALS
HEART RATE: 84 BPM | DIASTOLIC BLOOD PRESSURE: 81 MMHG | WEIGHT: 123 LBS | OXYGEN SATURATION: 98 % | BODY MASS INDEX: 20.49 KG/M2 | SYSTOLIC BLOOD PRESSURE: 174 MMHG | HEIGHT: 65 IN

## 2025-03-06 DIAGNOSIS — E78.5 HYPERLIPIDEMIA, UNSPECIFIED: ICD-10-CM

## 2025-03-06 DIAGNOSIS — I10 ESSENTIAL (PRIMARY) HYPERTENSION: ICD-10-CM

## 2025-03-06 DIAGNOSIS — R60.0 LOCALIZED EDEMA: ICD-10-CM

## 2025-03-06 DIAGNOSIS — Z92.89 PERSONAL HISTORY OF OTHER MEDICAL TREATMENT: ICD-10-CM

## 2025-03-06 DIAGNOSIS — R01.1 CARDIAC MURMUR, UNSPECIFIED: ICD-10-CM

## 2025-03-06 DIAGNOSIS — R73.03 PREDIABETES.: ICD-10-CM

## 2025-03-06 DIAGNOSIS — I49.3 VENTRICULAR PREMATURE DEPOLARIZATION: ICD-10-CM

## 2025-03-06 PROCEDURE — 99214 OFFICE O/P EST MOD 30 MIN: CPT

## 2025-03-10 PROBLEM — Z92.89 HISTORY OF ECHOCARDIOGRAM: Status: RESOLVED | Noted: 2025-02-04 | Resolved: 2025-03-10

## 2025-03-19 ENCOUNTER — RESULT REVIEW (OUTPATIENT)
Age: 89
End: 2025-03-19

## 2025-04-03 ENCOUNTER — APPOINTMENT (OUTPATIENT)
Dept: NEPHROLOGY | Facility: CLINIC | Age: 89
End: 2025-04-03
Payer: MEDICARE

## 2025-04-03 ENCOUNTER — RESULT REVIEW (OUTPATIENT)
Age: 89
End: 2025-04-03

## 2025-04-03 VITALS
OXYGEN SATURATION: 98 % | DIASTOLIC BLOOD PRESSURE: 80 MMHG | HEIGHT: 65 IN | BODY MASS INDEX: 20.33 KG/M2 | SYSTOLIC BLOOD PRESSURE: 178 MMHG | HEART RATE: 73 BPM | WEIGHT: 122 LBS

## 2025-04-03 DIAGNOSIS — E21.3 HYPERPARATHYROIDISM, UNSPECIFIED: ICD-10-CM

## 2025-04-03 DIAGNOSIS — I10 ESSENTIAL (PRIMARY) HYPERTENSION: ICD-10-CM

## 2025-04-03 DIAGNOSIS — E87.5 HYPERKALEMIA: ICD-10-CM

## 2025-04-03 DIAGNOSIS — N18.30 CHRONIC KIDNEY DISEASE, STAGE 3 UNSPECIFIED: ICD-10-CM

## 2025-04-03 PROCEDURE — 99214 OFFICE O/P EST MOD 30 MIN: CPT

## 2025-04-03 PROCEDURE — G2211 COMPLEX E/M VISIT ADD ON: CPT

## 2025-04-07 ENCOUNTER — TRANSCRIPTION ENCOUNTER (OUTPATIENT)
Age: 89
End: 2025-04-07

## 2025-04-09 ENCOUNTER — TRANSCRIPTION ENCOUNTER (OUTPATIENT)
Age: 89
End: 2025-04-09

## 2025-04-10 ENCOUNTER — RX RENEWAL (OUTPATIENT)
Age: 89
End: 2025-04-10

## 2025-04-22 ENCOUNTER — RESULT REVIEW (OUTPATIENT)
Age: 89
End: 2025-04-22

## 2025-04-22 ENCOUNTER — APPOINTMENT (OUTPATIENT)
Dept: NEPHROLOGY | Facility: CLINIC | Age: 89
End: 2025-04-22
Payer: MEDICARE

## 2025-04-22 DIAGNOSIS — R80.9 PROTEINURIA, UNSPECIFIED: ICD-10-CM

## 2025-04-22 DIAGNOSIS — N18.4 CHRONIC KIDNEY DISEASE, STAGE 4 (SEVERE): ICD-10-CM

## 2025-04-22 PROCEDURE — 36415 COLL VENOUS BLD VENIPUNCTURE: CPT

## 2025-05-06 ENCOUNTER — APPOINTMENT (OUTPATIENT)
Dept: NEUROLOGY | Facility: CLINIC | Age: 89
End: 2025-05-06
Payer: MEDICARE

## 2025-05-06 VITALS
WEIGHT: 122 LBS | OXYGEN SATURATION: 99 % | HEIGHT: 65 IN | HEART RATE: 73 BPM | SYSTOLIC BLOOD PRESSURE: 178 MMHG | DIASTOLIC BLOOD PRESSURE: 79 MMHG | BODY MASS INDEX: 20.33 KG/M2 | RESPIRATION RATE: 14 BRPM

## 2025-05-06 DIAGNOSIS — G62.9 POLYNEUROPATHY, UNSPECIFIED: ICD-10-CM

## 2025-05-06 DIAGNOSIS — R26.89 OTHER ABNORMALITIES OF GAIT AND MOBILITY: ICD-10-CM

## 2025-05-06 PROCEDURE — 99215 OFFICE O/P EST HI 40 MIN: CPT

## 2025-05-13 ENCOUNTER — TRANSCRIPTION ENCOUNTER (OUTPATIENT)
Age: 89
End: 2025-05-13

## 2025-06-09 ENCOUNTER — RESULT REVIEW (OUTPATIENT)
Age: 89
End: 2025-06-09

## 2025-06-09 ENCOUNTER — APPOINTMENT (OUTPATIENT)
Dept: HEMATOLOGY ONCOLOGY | Facility: CLINIC | Age: 89
End: 2025-06-09
Payer: MEDICARE

## 2025-06-09 VITALS
TEMPERATURE: 97.6 F | SYSTOLIC BLOOD PRESSURE: 174 MMHG | RESPIRATION RATE: 16 BRPM | DIASTOLIC BLOOD PRESSURE: 83 MMHG | WEIGHT: 123.06 LBS | OXYGEN SATURATION: 99 % | HEIGHT: 66 IN | HEART RATE: 75 BPM | BODY MASS INDEX: 19.78 KG/M2

## 2025-06-09 PROBLEM — R89.9 ABNORMAL LABORATORY TEST: Status: ACTIVE | Noted: 2025-06-09

## 2025-06-09 PROBLEM — D69.6 THROMBOCYTOPENIA: Status: ACTIVE | Noted: 2025-06-09

## 2025-06-09 PROCEDURE — 99205 OFFICE O/P NEW HI 60 MIN: CPT

## 2025-06-11 ENCOUNTER — APPOINTMENT (OUTPATIENT)
Dept: GERIATRICS | Facility: CLINIC | Age: 89
End: 2025-06-11
Payer: MEDICARE

## 2025-06-11 VITALS
SYSTOLIC BLOOD PRESSURE: 166 MMHG | WEIGHT: 123 LBS | DIASTOLIC BLOOD PRESSURE: 78 MMHG | HEIGHT: 66 IN | HEART RATE: 80 BPM | BODY MASS INDEX: 19.77 KG/M2 | OXYGEN SATURATION: 100 % | TEMPERATURE: 97.8 F

## 2025-06-11 PROBLEM — Z71.85 VACCINE COUNSELING: Status: ACTIVE | Noted: 2025-06-11

## 2025-06-11 PROCEDURE — 99214 OFFICE O/P EST MOD 30 MIN: CPT

## 2025-06-11 PROCEDURE — G2211 COMPLEX E/M VISIT ADD ON: CPT

## 2025-06-11 RX ORDER — VITAMIN B COMPLEX
CAPSULE ORAL
Qty: 90 | Refills: 3 | Status: ACTIVE | COMMUNITY
Start: 2025-06-11

## 2025-06-13 ENCOUNTER — TRANSCRIPTION ENCOUNTER (OUTPATIENT)
Age: 89
End: 2025-06-13

## 2025-06-20 ENCOUNTER — TRANSCRIPTION ENCOUNTER (OUTPATIENT)
Age: 89
End: 2025-06-20

## 2025-07-01 ENCOUNTER — APPOINTMENT (OUTPATIENT)
Dept: HEMATOLOGY ONCOLOGY | Facility: CLINIC | Age: 89
End: 2025-07-01

## 2025-07-01 VITALS
RESPIRATION RATE: 16 BRPM | HEART RATE: 75 BPM | TEMPERATURE: 97.4 F | WEIGHT: 123.5 LBS | DIASTOLIC BLOOD PRESSURE: 83 MMHG | SYSTOLIC BLOOD PRESSURE: 177 MMHG | BODY MASS INDEX: 19.85 KG/M2 | OXYGEN SATURATION: 99 % | HEIGHT: 66 IN

## 2025-07-01 PROCEDURE — 99214 OFFICE O/P EST MOD 30 MIN: CPT

## 2025-07-03 ENCOUNTER — APPOINTMENT (OUTPATIENT)
Dept: NEPHROLOGY | Facility: CLINIC | Age: 89
End: 2025-07-03
Payer: MEDICARE

## 2025-07-03 VITALS
OXYGEN SATURATION: 97 % | WEIGHT: 122 LBS | BODY MASS INDEX: 19.61 KG/M2 | HEART RATE: 82 BPM | HEIGHT: 66 IN | DIASTOLIC BLOOD PRESSURE: 90 MMHG | SYSTOLIC BLOOD PRESSURE: 170 MMHG

## 2025-07-03 PROCEDURE — G2211 COMPLEX E/M VISIT ADD ON: CPT

## 2025-07-03 PROCEDURE — 99214 OFFICE O/P EST MOD 30 MIN: CPT

## 2025-07-08 ENCOUNTER — APPOINTMENT (OUTPATIENT)
Dept: CARDIOLOGY | Facility: CLINIC | Age: 89
End: 2025-07-08
Payer: MEDICARE

## 2025-07-08 ENCOUNTER — TRANSCRIPTION ENCOUNTER (OUTPATIENT)
Age: 89
End: 2025-07-08

## 2025-07-08 VITALS
BODY MASS INDEX: 19.61 KG/M2 | HEART RATE: 72 BPM | WEIGHT: 122 LBS | SYSTOLIC BLOOD PRESSURE: 170 MMHG | DIASTOLIC BLOOD PRESSURE: 72 MMHG | OXYGEN SATURATION: 99 % | HEIGHT: 66 IN

## 2025-07-08 PROCEDURE — 99214 OFFICE O/P EST MOD 30 MIN: CPT

## 2025-07-08 PROCEDURE — 93000 ELECTROCARDIOGRAM COMPLETE: CPT

## 2025-07-15 ENCOUNTER — TRANSCRIPTION ENCOUNTER (OUTPATIENT)
Age: 89
End: 2025-07-15

## 2025-07-16 ENCOUNTER — RX RENEWAL (OUTPATIENT)
Age: 89
End: 2025-07-16

## 2025-07-18 ENCOUNTER — RESULT REVIEW (OUTPATIENT)
Age: 89
End: 2025-07-18

## 2025-07-22 ENCOUNTER — TRANSCRIPTION ENCOUNTER (OUTPATIENT)
Age: 89
End: 2025-07-22

## 2025-07-22 PROBLEM — D47.2 MONOCLONAL GAMMOPATHY: Status: ACTIVE | Noted: 2025-06-11

## 2025-07-23 ENCOUNTER — TRANSCRIPTION ENCOUNTER (OUTPATIENT)
Age: 89
End: 2025-07-23

## 2025-07-24 DIAGNOSIS — E53.8 DEFICIENCY OF OTHER SPECIFIED B GROUP VITAMINS: ICD-10-CM

## 2025-07-24 RX ORDER — CYANOCOBALAMIN (VITAMIN B-12) 1000 MCG
1000 TABLET ORAL EVERY OTHER DAY
Qty: 30 | Refills: 5 | Status: ACTIVE | COMMUNITY
Start: 2025-07-24 | End: 1900-01-01

## 2025-08-22 DIAGNOSIS — E87.5 HYPERKALEMIA: ICD-10-CM

## 2025-08-22 DIAGNOSIS — R80.9 PROTEINURIA, UNSPECIFIED: ICD-10-CM

## 2025-08-25 ENCOUNTER — APPOINTMENT (OUTPATIENT)
Dept: NEPHROLOGY | Facility: CLINIC | Age: 89
End: 2025-08-25
Payer: MEDICARE

## 2025-08-25 ENCOUNTER — RESULT REVIEW (OUTPATIENT)
Age: 89
End: 2025-08-25

## 2025-08-25 DIAGNOSIS — N18.30 CHRONIC KIDNEY DISEASE, STAGE 3 UNSPECIFIED: ICD-10-CM

## 2025-08-25 DIAGNOSIS — Q61.02 CONGENITAL MULTIPLE RENAL CYSTS: ICD-10-CM

## 2025-08-25 PROCEDURE — 36415 COLL VENOUS BLD VENIPUNCTURE: CPT

## 2025-09-10 ENCOUNTER — RX RENEWAL (OUTPATIENT)
Age: 89
End: 2025-09-10